# Patient Record
Sex: MALE | Race: WHITE | NOT HISPANIC OR LATINO | Employment: UNEMPLOYED | URBAN - METROPOLITAN AREA
[De-identification: names, ages, dates, MRNs, and addresses within clinical notes are randomized per-mention and may not be internally consistent; named-entity substitution may affect disease eponyms.]

---

## 2019-10-16 ENCOUNTER — HOSPITAL ENCOUNTER (EMERGENCY)
Facility: HOSPITAL | Age: 59
Discharge: HOME | End: 2019-10-16
Attending: EMERGENCY MEDICINE
Payer: MEDICAID

## 2019-10-16 VITALS
DIASTOLIC BLOOD PRESSURE: 95 MMHG | WEIGHT: 155 LBS | HEART RATE: 87 BPM | HEIGHT: 68 IN | SYSTOLIC BLOOD PRESSURE: 151 MMHG | BODY MASS INDEX: 23.49 KG/M2 | RESPIRATION RATE: 17 BRPM | TEMPERATURE: 99 F | OXYGEN SATURATION: 98 %

## 2019-10-16 DIAGNOSIS — K29.70 GASTRITIS WITHOUT BLEEDING, UNSPECIFIED CHRONICITY, UNSPECIFIED GASTRITIS TYPE: ICD-10-CM

## 2019-10-16 DIAGNOSIS — R10.9 ABDOMINAL PAIN: ICD-10-CM

## 2019-10-16 DIAGNOSIS — R10.9 ABDOMINAL PAIN, UNSPECIFIED ABDOMINAL LOCATION: Primary | ICD-10-CM

## 2019-10-16 LAB
ALBUMIN SERPL BCP-MCNC: 4.1 G/DL (ref 3.5–5.2)
ALP SERPL-CCNC: 66 U/L (ref 55–135)
ALT SERPL W/O P-5'-P-CCNC: 15 U/L (ref 10–44)
AMPHET+METHAMPHET UR QL: NEGATIVE
AMYLASE SERPL-CCNC: 36 U/L (ref 20–110)
ANION GAP SERPL CALC-SCNC: 13 MMOL/L (ref 8–16)
AST SERPL-CCNC: 21 U/L (ref 10–40)
BARBITURATES UR QL SCN>200 NG/ML: NEGATIVE
BASOPHILS # BLD AUTO: 0.05 K/UL (ref 0–0.2)
BASOPHILS NFR BLD: 0.4 % (ref 0–1.9)
BENZODIAZ UR QL SCN>200 NG/ML: NEGATIVE
BILIRUB SERPL-MCNC: 0.8 MG/DL (ref 0.1–1)
BILIRUB UR QL STRIP: NEGATIVE
BNP SERPL-MCNC: 56 PG/ML (ref 0–99)
BUN SERPL-MCNC: 11 MG/DL (ref 6–20)
BZE UR QL SCN: NEGATIVE
CALCIUM SERPL-MCNC: 9.2 MG/DL (ref 8.7–10.5)
CANNABINOIDS UR QL SCN: NEGATIVE
CHLORIDE SERPL-SCNC: 93 MMOL/L (ref 95–110)
CK MB SERPL-MCNC: 2 NG/ML (ref 0.1–6.5)
CLARITY UR: ABNORMAL
CO2 SERPL-SCNC: 32 MMOL/L (ref 23–29)
COLOR UR: YELLOW
CREAT SERPL-MCNC: 0.9 MG/DL (ref 0.5–1.4)
CREAT UR-MCNC: 94 MG/DL (ref 23–375)
DIFFERENTIAL METHOD: ABNORMAL
EOSINOPHIL # BLD AUTO: 0 K/UL (ref 0–0.5)
EOSINOPHIL NFR BLD: 0.3 % (ref 0–8)
ERYTHROCYTE [DISTWIDTH] IN BLOOD BY AUTOMATED COUNT: 13.3 % (ref 11.5–14.5)
EST. GFR  (AFRICAN AMERICAN): >60 ML/MIN/1.73 M^2
EST. GFR  (NON AFRICAN AMERICAN): >60 ML/MIN/1.73 M^2
GLUCOSE SERPL-MCNC: 144 MG/DL (ref 70–110)
GLUCOSE UR QL STRIP: NEGATIVE
HCT VFR BLD AUTO: 45.2 % (ref 40–54)
HGB BLD-MCNC: 15.1 G/DL (ref 14–18)
HGB UR QL STRIP: NEGATIVE
IMM GRANULOCYTES # BLD AUTO: 0.07 K/UL (ref 0–0.04)
IMM GRANULOCYTES NFR BLD AUTO: 0.6 % (ref 0–0.5)
KETONES UR QL STRIP: ABNORMAL
LEUKOCYTE ESTERASE UR QL STRIP: NEGATIVE
LIPASE SERPL-CCNC: 24 U/L (ref 4–60)
LYMPHOCYTES # BLD AUTO: 2 K/UL (ref 1–4.8)
LYMPHOCYTES NFR BLD: 16.3 % (ref 18–48)
MAGNESIUM SERPL-MCNC: 1.7 MG/DL (ref 1.6–2.6)
MCH RBC QN AUTO: 29.2 PG (ref 27–31)
MCHC RBC AUTO-ENTMCNC: 33.4 G/DL (ref 32–36)
MCV RBC AUTO: 87 FL (ref 82–98)
MONOCYTES # BLD AUTO: 0.9 K/UL (ref 0.3–1)
MONOCYTES NFR BLD: 6.9 % (ref 4–15)
NEUTROPHILS # BLD AUTO: 9.3 K/UL (ref 1.8–7.7)
NEUTROPHILS NFR BLD: 75.5 % (ref 38–73)
NITRITE UR QL STRIP: NEGATIVE
NRBC BLD-RTO: 0 /100 WBC
OPIATES UR QL SCN: NEGATIVE
PCP UR QL SCN>25 NG/ML: NEGATIVE
PH UR STRIP: >8 [PH] (ref 5–8)
PLATELET # BLD AUTO: 316 K/UL (ref 150–350)
PMV BLD AUTO: 8.9 FL (ref 9.2–12.9)
POTASSIUM SERPL-SCNC: 3.5 MMOL/L (ref 3.5–5.1)
PROT SERPL-MCNC: 7.6 G/DL (ref 6–8.4)
PROT UR QL STRIP: ABNORMAL
RBC # BLD AUTO: 5.17 M/UL (ref 4.6–6.2)
SODIUM SERPL-SCNC: 138 MMOL/L (ref 136–145)
SP GR UR STRIP: 1.01 (ref 1–1.03)
TOXICOLOGY INFORMATION: NORMAL
TROPONIN I SERPL DL<=0.01 NG/ML-MCNC: <0.03 NG/ML (ref 0.02–0.04)
TSH SERPL DL<=0.005 MIU/L-ACNC: 1.43 UIU/ML (ref 0.34–5.6)
URN SPEC COLLECT METH UR: ABNORMAL
UROBILINOGEN UR STRIP-ACNC: NEGATIVE EU/DL
WBC # BLD AUTO: 12.3 K/UL (ref 3.9–12.7)

## 2019-10-16 PROCEDURE — 82150 ASSAY OF AMYLASE: CPT

## 2019-10-16 PROCEDURE — 80307 DRUG TEST PRSMV CHEM ANLYZR: CPT

## 2019-10-16 PROCEDURE — 99285 EMERGENCY DEPT VISIT HI MDM: CPT | Mod: 25

## 2019-10-16 PROCEDURE — 81003 URINALYSIS AUTO W/O SCOPE: CPT | Mod: 59

## 2019-10-16 PROCEDURE — 93005 ELECTROCARDIOGRAM TRACING: CPT

## 2019-10-16 PROCEDURE — 80053 COMPREHEN METABOLIC PANEL: CPT

## 2019-10-16 PROCEDURE — 25000003 PHARM REV CODE 250: Performed by: EMERGENCY MEDICINE

## 2019-10-16 PROCEDURE — 63600175 PHARM REV CODE 636 W HCPCS: Performed by: EMERGENCY MEDICINE

## 2019-10-16 PROCEDURE — 25500020 PHARM REV CODE 255: Performed by: EMERGENCY MEDICINE

## 2019-10-16 PROCEDURE — 85025 COMPLETE CBC W/AUTO DIFF WBC: CPT

## 2019-10-16 PROCEDURE — 36415 COLL VENOUS BLD VENIPUNCTURE: CPT

## 2019-10-16 PROCEDURE — 82553 CREATINE MB FRACTION: CPT

## 2019-10-16 PROCEDURE — 83735 ASSAY OF MAGNESIUM: CPT

## 2019-10-16 PROCEDURE — 84443 ASSAY THYROID STIM HORMONE: CPT

## 2019-10-16 PROCEDURE — 96365 THER/PROPH/DIAG IV INF INIT: CPT

## 2019-10-16 PROCEDURE — 83690 ASSAY OF LIPASE: CPT

## 2019-10-16 PROCEDURE — 96375 TX/PRO/DX INJ NEW DRUG ADDON: CPT

## 2019-10-16 PROCEDURE — 84484 ASSAY OF TROPONIN QUANT: CPT

## 2019-10-16 PROCEDURE — S0028 INJECTION, FAMOTIDINE, 20 MG: HCPCS | Performed by: EMERGENCY MEDICINE

## 2019-10-16 PROCEDURE — 83880 ASSAY OF NATRIURETIC PEPTIDE: CPT

## 2019-10-16 RX ORDER — ACETAMINOPHEN 10 MG/ML
1000 INJECTION, SOLUTION INTRAVENOUS EVERY 8 HOURS
Status: DISCONTINUED | OUTPATIENT
Start: 2019-10-16 | End: 2019-10-16

## 2019-10-16 RX ORDER — METRONIDAZOLE 500 MG/1
500 TABLET ORAL 3 TIMES DAILY
Qty: 30 TABLET | Refills: 0 | Status: SHIPPED | OUTPATIENT
Start: 2019-10-16 | End: 2019-10-26

## 2019-10-16 RX ORDER — SUCRALFATE 1 G/10ML
1 SUSPENSION ORAL
Status: COMPLETED | OUTPATIENT
Start: 2019-10-16 | End: 2019-10-16

## 2019-10-16 RX ORDER — RABEPRAZOLE SODIUM 20 MG/1
20 TABLET, DELAYED RELEASE ORAL DAILY
Qty: 30 TABLET | Refills: 11 | Status: SHIPPED | OUTPATIENT
Start: 2019-10-16 | End: 2020-02-09

## 2019-10-16 RX ORDER — FAMOTIDINE 20 MG/50ML
20 INJECTION, SOLUTION INTRAVENOUS
Status: COMPLETED | OUTPATIENT
Start: 2019-10-16 | End: 2019-10-16

## 2019-10-16 RX ORDER — ONDANSETRON 4 MG/1
4 TABLET, FILM COATED ORAL EVERY 6 HOURS PRN
Qty: 12 TABLET | Refills: 0 | Status: SHIPPED | OUTPATIENT
Start: 2019-10-16 | End: 2020-02-09

## 2019-10-16 RX ORDER — CIPROFLOXACIN 500 MG/1
500 TABLET ORAL 2 TIMES DAILY
Qty: 20 TABLET | Refills: 0 | Status: SHIPPED | OUTPATIENT
Start: 2019-10-16 | End: 2019-10-26

## 2019-10-16 RX ORDER — SUCRALFATE 1 G/1
1 TABLET ORAL
Qty: 60 TABLET | Refills: 1 | Status: SHIPPED | OUTPATIENT
Start: 2019-10-16 | End: 2020-02-09

## 2019-10-16 RX ORDER — ONDANSETRON 2 MG/ML
4 INJECTION INTRAMUSCULAR; INTRAVENOUS ONCE
Status: COMPLETED | OUTPATIENT
Start: 2019-10-16 | End: 2019-10-16

## 2019-10-16 RX ORDER — ACETAMINOPHEN 10 MG/ML
1000 INJECTION, SOLUTION INTRAVENOUS EVERY 8 HOURS
Status: DISCONTINUED | OUTPATIENT
Start: 2019-10-16 | End: 2019-10-16 | Stop reason: HOSPADM

## 2019-10-16 RX ORDER — HYOSCYAMINE SULFATE 0.12 MG/1
0.12 TABLET SUBLINGUAL
Status: COMPLETED | OUTPATIENT
Start: 2019-10-16 | End: 2019-10-16

## 2019-10-16 RX ADMIN — FAMOTIDINE 20 MG: 20 INJECTION, SOLUTION INTRAVENOUS at 03:10

## 2019-10-16 RX ADMIN — IOHEXOL 100 ML: 350 INJECTION, SOLUTION INTRAVENOUS at 02:10

## 2019-10-16 RX ADMIN — SUCRALFATE 1 G: 1 SUSPENSION ORAL at 03:10

## 2019-10-16 RX ADMIN — ACETAMINOPHEN 1000 MG: 10 INJECTION, SOLUTION INTRAVENOUS at 03:10

## 2019-10-16 RX ADMIN — ONDANSETRON 4 MG: 2 INJECTION INTRAMUSCULAR; INTRAVENOUS at 02:10

## 2019-10-16 RX ADMIN — HYOSCYAMINE SULFATE 0.12 MG: 0.12 TABLET ORAL; SUBLINGUAL at 02:10

## 2019-12-21 NOTE — ED PROVIDER NOTES
Encounter Date: 10/16/2019       History     Chief Complaint   Patient presents with    Abdominal Pain     This is a 59-year-old male who presents complaining of diffuse crampy abdominal pain off and on over the last day or 2 with associated nausea and diarrhea.  Pain is worse with eating diarrhea is worse with eating and symptoms are better with not eating.  Pain has been diffuse.  Pain has been moderate to mild in intensity.  He has had several episodes of nausea vomiting and several episodes of diarrhea.  He has not visualize any gastrointestinal bleeding.  He denies weakness dizziness or lightheadedness.  He has not been around anyone with similar symptoms and has not eaten anything that he feels could have been a possible source of food poisoning.  He denies chest pain or shortness of breath he denies fever chills or weight loss.  He has not been on antibiotics recently.  He is urinating normally.  He denies fatigue fever or constitutional symptoms. He denies any other problems or complaints.        Review of patient's allergies indicates:  No Known Allergies  No past medical history on file.  No past surgical history on file.  No family history on file.  Social History     Tobacco Use    Smoking status: Not on file   Substance Use Topics    Alcohol use: Not on file    Drug use: Not on file     Review of Systems   Constitutional: Negative.  Negative for activity change, appetite change, chills, diaphoresis, fatigue, fever and unexpected weight change.   HENT: Negative.  Negative for congestion, dental problem, ear pain, nosebleeds, postnasal drip, rhinorrhea, sinus pressure, sinus pain, sore throat, tinnitus, trouble swallowing and voice change.    Eyes: Negative.  Negative for pain and visual disturbance.   Respiratory: Negative.  Negative for cough, chest tightness, shortness of breath and wheezing.    Cardiovascular: Negative.  Negative for chest pain, palpitations and leg swelling.   Gastrointestinal:  Positive for abdominal pain, diarrhea, nausea and vomiting. Negative for abdominal distention, anal bleeding, blood in stool, constipation and rectal pain.   Endocrine: Negative.    Genitourinary: Negative.  Negative for difficulty urinating, dysuria, flank pain, frequency, penile pain, scrotal swelling, testicular pain and urgency.   Musculoskeletal: Negative.  Negative for arthralgias, back pain, gait problem, joint swelling, myalgias, neck pain and neck stiffness.   Skin: Negative.  Negative for color change, pallor and rash.   Allergic/Immunologic: Negative.    Neurological: Negative.  Negative for dizziness, tremors, seizures, syncope, facial asymmetry, speech difficulty, weakness, light-headedness, numbness and headaches.   Hematological: Negative.  Negative for adenopathy. Does not bruise/bleed easily.   Psychiatric/Behavioral: Negative.  Negative for confusion.   All other systems reviewed and are negative.      Physical Exam     Initial Vitals [10/16/19 0037]   BP Pulse Resp Temp SpO2   (!) 168/105 98 16 98.7 °F (37.1 °C) 99 %      MAP       --         Physical Exam    Nursing note and vitals reviewed.  Constitutional: He appears well-developed and well-nourished. He is active.  Non-toxic appearance. He does not have a sickly appearance. He does not appear ill. No distress.   HENT:   Head: Normocephalic and atraumatic.   Nose: Nose normal.   Mouth/Throat: Uvula is midline, oropharynx is clear and moist and mucous membranes are normal.   Eyes: Conjunctivae, EOM and lids are normal. Pupils are equal, round, and reactive to light.   Neck: Normal range of motion and full passive range of motion without pain. Neck supple. No thyromegaly present. No spinous process tenderness and no muscular tenderness present. No tracheal deviation, no edema, no erythema and normal range of motion present. No neck rigidity. No JVD present.   Cardiovascular: Normal rate, regular rhythm, normal heart sounds, intact distal pulses  and normal pulses. Exam reveals no gallop and no friction rub.    No murmur heard.  Pulmonary/Chest: Effort normal and breath sounds normal. No stridor. No respiratory distress. He has no wheezes. He has no rhonchi. He has no rales.   Abdominal: Soft. Normal appearance. He exhibits no distension and no mass. Bowel sounds are increased. There is generalized tenderness. There is no rigidity, no rebound, no guarding and no CVA tenderness. No hernia.   Musculoskeletal: He exhibits no tenderness.        Cervical back: Normal. He exhibits normal range of motion, no tenderness, no bony tenderness and no swelling.        Thoracic back: He exhibits normal range of motion, no tenderness, no bony tenderness and no swelling.        Lumbar back: Normal. He exhibits normal range of motion, no tenderness, no bony tenderness, no swelling and no edema.   Pulses are 2+ throughout, cap refill is less than 2 sec throughout, no edema noted, extremities are nontender throughout with full range of motion   Lymphadenopathy:     He has no cervical adenopathy.   Neurological: He is alert and oriented to person, place, and time. He has normal strength. No cranial nerve deficit or sensory deficit. Coordination normal.   Skin: Skin is warm and dry. Capillary refill takes less than 2 seconds. No ecchymosis, no petechiae and no rash noted. No cyanosis or erythema. No pallor.   Psychiatric: He has a normal mood and affect. His speech is normal and behavior is normal. Judgment and thought content normal. Cognition and memory are normal.         ED Course   Procedures  Labs Reviewed   CBC W/ AUTO DIFFERENTIAL - Abnormal; Notable for the following components:       Result Value    MPV 8.9 (*)     Immature Granulocytes 0.6 (*)     Gran # (ANC) 9.3 (*)     Immature Grans (Abs) 0.07 (*)     Gran% 75.5 (*)     Lymph% 16.3 (*)     All other components within normal limits   COMPREHENSIVE METABOLIC PANEL - Abnormal; Notable for the following components:     Chloride 93 (*)     CO2 32 (*)     Glucose 144 (*)     All other components within normal limits   URINALYSIS, REFLEX TO URINE CULTURE - Abnormal; Notable for the following components:    Appearance, UA Hazy (*)     pH, UA >8.0 (*)     Protein, UA Trace (*)     Ketones, UA 1+ (*)     All other components within normal limits    Narrative:     Preferred Collection Type->Urine, Clean Catch  Specimen Source->Urine   LIPASE   DRUG SCREEN PANEL, URINE EMERGENCY   AMYLASE   B-TYPE NATRIURETIC PEPTIDE   CK   CK-MB   MAGNESIUM   TROPONIN I   TSH   URINALYSIS   AMYLASE   CK-MB   B-TYPE NATRIURETIC PEPTIDE   MAGNESIUM   TSH   TROPONIN I   DRUG SCREEN PANEL, URINE EMERGENCY        ECG Results          EKG 12-lead (In process)  Result time 10/16/19 04:33:17    In process by Interface, Lab In [facility] (10/16/19 04:33:17)                 Narrative:    Test Reason : R10.9,    Vent. Rate : 092 BPM     Atrial Rate : 092 BPM     P-R Int : 156 ms          QRS Dur : 082 ms      QT Int : 464 ms       P-R-T Axes : 069 000 104 degrees     QTc Int : 573 ms    Sinus rhythm with occasional Premature ventricular complexes  Biatrial enlargement  Septal infarct ,age undetermined  ST and T wave abnormality, consider inferior ischemia  Abnormal ECG  No previous ECGs available    Referred By: [email or web address]   SELF           Confirmed By:                             Imaging Results          CT Abdomen Pelvis With Contrast (Final result)  Result time 10/16/19 02:21:42    Final result by Jose Denson MD (10/16/19 02:21:42)                 Narrative:        Exam: CT OF THE ABDOMEN/PELVIS WITH IV CONTRAST    Clinical data: Abdominal pain.    Technique: Direct contiguous axial CT images were acquired through the abdomen  and pelvis with intravenous contrast using soft tissue and bone algorithms. Oral  contrast was not administered. Reformatted/MPR images were performed. Contrast  used: Omnipaque 350. Amount: 100 mL. Radiation dose:  CTDIvol = 6.90 mGy, DLP  =  341.00 mGy x cm.    Limitations: Lack of oral contrast limits evaluation of the bowel loops.    Prior Studies: No prior studies submitted.    Findings: Lung bases:  Clear    Liver:   Unremarkable size and contour. Normal density. No evidence of mass. No  evidence of dilated ducts.    Gallbladder:  Unremarkable    Spleen:  Grossly unremarkable.    Pancreas/adrenal glands:   Grossly unremarkable size, contour and density.    Kidneys:   In anatomic position. Grossly unremarkable renal size, contour and  density. No renal or ureteral calculi. No evidence of a renal mass or cyst.  Perinephric space is unremarkable.    Retroperitoneum: No enlarged retroperitoneal lymphadenopathy. The aorta and IVC  appear unremarkable.    Peritoneal cavity:  No evidence of free air or ascites.    Gastrointestinal tract: No obstruction.  Mucosal thickening and enhancement of  the stomach, the stomach is moderately distended with fluid.  Mucosal thickening  and enhancement of the first and second part of the duodenum.  Similar milder  changes of the proximal jejunum.    Appendix:  Unremarkable    Pelvis:  Solid and hollow viscera grossly unremarkable.    A small umbilical hernia is identified containing intra-abdominal fat with  defect measuring 1.5 cm.    Osseous structures:  No acute or destructive bony process identified.    IMPRESSION:  1.  Moderately severe gastroenteritis, etiology may be infectious, inflammatory  or related to peptic ulcer disease.  2.  Small umbilical hernia containing intra-abdominal fat.    Recommendation: Follow up as clinically indicated.    All CT scans at this facility utilize dose modulation, iterative reconstruction,  and/or weight based dosing when appropriate to reduce radiation dose to as low  as reasonably achievable.      Electronically Signed by MARIAM LOCO MD at 10/16/2019 3:43:29 AM                               Medical Decision Making:   Clinical Tests:   Lab Tests: Reviewed  Radiological  Study: Reviewed  Medical Tests: Reviewed  ED Management:  CT suggestive of gastroenteritis which coincides with symptoms.  Patient is currently tolerating oral fluids.  He feels better.  He is hemodynamically stable. Importance of follow-up has been discussed.  Return precautions have been discussed in detail.  Abdominal reassessed is soft and nontender in all quadrants.  Have explained the need for blood pressure re-evaluation within 24 hr as blood pressure has been slightly elevated and blood pressure precautions have been given.                                 Clinical Impression:       ICD-10-CM ICD-9-CM   1. Abdominal pain, unspecified abdominal location R10.9 789.00   2. Abdominal pain R10.9 789.00   3. Gastritis without bleeding, unspecified chronicity, unspecified gastritis type K29.70 535.50                             Raquel Ceballos MD  12/20/19 [id number]

## 2020-02-09 ENCOUNTER — HOSPITAL ENCOUNTER (INPATIENT)
Facility: HOSPITAL | Age: 60
LOS: 4 days | Discharge: HOME | DRG: 195 | End: 2020-02-13
Attending: EMERGENCY MEDICINE | Admitting: INTERNAL MEDICINE
Payer: MEDICAID

## 2020-02-09 DIAGNOSIS — J18.9 PNEUMONIA: ICD-10-CM

## 2020-02-09 DIAGNOSIS — R07.9 CHEST PAIN: ICD-10-CM

## 2020-02-09 DIAGNOSIS — J15.9 PNEUMONIA DUE TO GRAM-POSITIVE BACTERIA: Primary | ICD-10-CM

## 2020-02-09 PROBLEM — D50.9 MICROCYTIC HYPOCHROMIC ANEMIA: Status: ACTIVE | Noted: 2020-02-09

## 2020-02-09 PROBLEM — F17.200 TOBACCO DEPENDENCE: Status: ACTIVE | Noted: 2020-02-09

## 2020-02-09 PROBLEM — E87.6 HYPOKALEMIA: Status: ACTIVE | Noted: 2020-02-09

## 2020-02-09 LAB
ALBUMIN SERPL BCP-MCNC: 3.2 G/DL (ref 3.5–5.2)
ALP SERPL-CCNC: 60 U/L (ref 55–135)
ALT SERPL W/O P-5'-P-CCNC: 36 U/L (ref 10–44)
ANION GAP SERPL CALC-SCNC: 10 MMOL/L (ref 8–16)
AST SERPL-CCNC: 37 U/L (ref 10–40)
BASOPHILS # BLD AUTO: 0.06 K/UL (ref 0–0.2)
BASOPHILS NFR BLD: 0.4 % (ref 0–1.9)
BILIRUB SERPL-MCNC: 0.7 MG/DL (ref 0.1–1)
BNP SERPL-MCNC: 41 PG/ML (ref 0–99)
BUN SERPL-MCNC: 17 MG/DL (ref 6–20)
CALCIUM SERPL-MCNC: 8.5 MG/DL (ref 8.7–10.5)
CHLORIDE SERPL-SCNC: 102 MMOL/L (ref 95–110)
CO2 SERPL-SCNC: 26 MMOL/L (ref 23–29)
CREAT SERPL-MCNC: 0.8 MG/DL (ref 0.5–1.4)
DIFFERENTIAL METHOD: ABNORMAL
EOSINOPHIL # BLD AUTO: 0.1 K/UL (ref 0–0.5)
EOSINOPHIL NFR BLD: 1 % (ref 0–8)
ERYTHROCYTE [DISTWIDTH] IN BLOOD BY AUTOMATED COUNT: 21.3 % (ref 11.5–14.5)
EST. GFR  (AFRICAN AMERICAN): >60 ML/MIN/1.73 M^2
EST. GFR  (NON AFRICAN AMERICAN): >60 ML/MIN/1.73 M^2
GLUCOSE SERPL-MCNC: 112 MG/DL (ref 70–110)
HCT VFR BLD AUTO: 29 % (ref 40–54)
HGB BLD-MCNC: 8.8 G/DL (ref 14–18)
IMM GRANULOCYTES # BLD AUTO: 0.17 K/UL (ref 0–0.04)
IMM GRANULOCYTES NFR BLD AUTO: 1.2 % (ref 0–0.5)
INFLUENZA A, MOLECULAR: NEGATIVE
INFLUENZA B, MOLECULAR: NEGATIVE
LACTATE SERPL-SCNC: 1.5 MMOL/L (ref 0.5–1.9)
LYMPHOCYTES # BLD AUTO: 1.4 K/UL (ref 1–4.8)
LYMPHOCYTES NFR BLD: 9.4 % (ref 18–48)
MCH RBC QN AUTO: 21.1 PG (ref 27–31)
MCHC RBC AUTO-ENTMCNC: 30.3 G/DL (ref 32–36)
MCV RBC AUTO: 69 FL (ref 82–98)
MONOCYTES # BLD AUTO: 1.2 K/UL (ref 0.3–1)
MONOCYTES NFR BLD: 7.9 % (ref 4–15)
MRSA SCREEN BY PCR: NEGATIVE
NEUTROPHILS # BLD AUTO: 11.7 K/UL (ref 1.8–7.7)
NEUTROPHILS NFR BLD: 80.1 % (ref 38–73)
NRBC BLD-RTO: 0 /100 WBC
PLATELET # BLD AUTO: 501 K/UL (ref 150–350)
PMV BLD AUTO: 8.3 FL (ref 9.2–12.9)
POTASSIUM SERPL-SCNC: 3.4 MMOL/L (ref 3.5–5.1)
PROT SERPL-MCNC: 7.3 G/DL (ref 6–8.4)
RBC # BLD AUTO: 4.18 M/UL (ref 4.6–6.2)
SODIUM SERPL-SCNC: 138 MMOL/L (ref 136–145)
SPECIMEN SOURCE: NORMAL
TROPONIN I SERPL DL<=0.01 NG/ML-MCNC: <0.03 NG/ML
TROPONIN I SERPL DL<=0.01 NG/ML-MCNC: <0.03 NG/ML
WBC # BLD AUTO: 14.6 K/UL (ref 3.9–12.7)

## 2020-02-09 PROCEDURE — 12000002 HC ACUTE/MED SURGE SEMI-PRIVATE ROOM

## 2020-02-09 PROCEDURE — 84484 ASSAY OF TROPONIN QUANT: CPT

## 2020-02-09 PROCEDURE — 25000003 PHARM REV CODE 250: Performed by: EMERGENCY MEDICINE

## 2020-02-09 PROCEDURE — 25000003 PHARM REV CODE 250: Performed by: NURSE PRACTITIONER

## 2020-02-09 PROCEDURE — 99285 EMERGENCY DEPT VISIT HI MDM: CPT | Mod: 25

## 2020-02-09 PROCEDURE — 87641 MR-STAPH DNA AMP PROBE: CPT

## 2020-02-09 PROCEDURE — 63600175 PHARM REV CODE 636 W HCPCS: Performed by: NURSE PRACTITIONER

## 2020-02-09 PROCEDURE — 93005 ELECTROCARDIOGRAM TRACING: CPT

## 2020-02-09 PROCEDURE — 25000242 PHARM REV CODE 250 ALT 637 W/ HCPCS: Performed by: NURSE PRACTITIONER

## 2020-02-09 PROCEDURE — 99900035 HC TECH TIME PER 15 MIN (STAT)

## 2020-02-09 PROCEDURE — 80053 COMPREHEN METABOLIC PANEL: CPT

## 2020-02-09 PROCEDURE — 85025 COMPLETE CBC W/AUTO DIFF WBC: CPT

## 2020-02-09 PROCEDURE — 87502 INFLUENZA DNA AMP PROBE: CPT

## 2020-02-09 PROCEDURE — 84484 ASSAY OF TROPONIN QUANT: CPT | Mod: 91

## 2020-02-09 PROCEDURE — 36415 COLL VENOUS BLD VENIPUNCTURE: CPT

## 2020-02-09 PROCEDURE — 94761 N-INVAS EAR/PLS OXIMETRY MLT: CPT

## 2020-02-09 PROCEDURE — 94640 AIRWAY INHALATION TREATMENT: CPT

## 2020-02-09 PROCEDURE — 83605 ASSAY OF LACTIC ACID: CPT

## 2020-02-09 PROCEDURE — 83880 ASSAY OF NATRIURETIC PEPTIDE: CPT

## 2020-02-09 PROCEDURE — 25000242 PHARM REV CODE 250 ALT 637 W/ HCPCS: Performed by: EMERGENCY MEDICINE

## 2020-02-09 PROCEDURE — 87040 BLOOD CULTURE FOR BACTERIA: CPT

## 2020-02-09 RX ORDER — SIMETHICONE 80 MG
2 TABLET,CHEWABLE ORAL
Status: COMPLETED | OUTPATIENT
Start: 2020-02-09 | End: 2020-02-09

## 2020-02-09 RX ORDER — LEVALBUTEROL INHALATION SOLUTION 0.63 MG/3ML
0.63 SOLUTION RESPIRATORY (INHALATION)
Status: DISCONTINUED | OUTPATIENT
Start: 2020-02-10 | End: 2020-02-12

## 2020-02-09 RX ORDER — HYDROCODONE POLISTIREX AND CHLORPHENIRAMINE POLISTIREX 10; 8 MG/5ML; MG/5ML
5 SUSPENSION, EXTENDED RELEASE ORAL EVERY 12 HOURS PRN
Status: DISCONTINUED | OUTPATIENT
Start: 2020-02-09 | End: 2020-02-13 | Stop reason: HOSPADM

## 2020-02-09 RX ORDER — KETOROLAC TROMETHAMINE 30 MG/ML
15 INJECTION, SOLUTION INTRAMUSCULAR; INTRAVENOUS EVERY 6 HOURS PRN
Status: DISCONTINUED | OUTPATIENT
Start: 2020-02-09 | End: 2020-02-10

## 2020-02-09 RX ORDER — ACETAMINOPHEN 500 MG
1000 TABLET ORAL
Status: COMPLETED | OUTPATIENT
Start: 2020-02-09 | End: 2020-02-09

## 2020-02-09 RX ORDER — METHYLPREDNISOLONE SOD SUCC 125 MG
80 VIAL (EA) INJECTION EVERY 8 HOURS
Status: COMPLETED | OUTPATIENT
Start: 2020-02-09 | End: 2020-02-10

## 2020-02-09 RX ORDER — MONTELUKAST SODIUM 10 MG/1
10 TABLET ORAL NIGHTLY
Status: DISCONTINUED | OUTPATIENT
Start: 2020-02-09 | End: 2020-02-13 | Stop reason: HOSPADM

## 2020-02-09 RX ORDER — ONDANSETRON 2 MG/ML
4 INJECTION INTRAMUSCULAR; INTRAVENOUS EVERY 8 HOURS PRN
Status: DISCONTINUED | OUTPATIENT
Start: 2020-02-09 | End: 2020-02-13 | Stop reason: HOSPADM

## 2020-02-09 RX ORDER — IBUPROFEN 200 MG
1 TABLET ORAL DAILY
Status: DISCONTINUED | OUTPATIENT
Start: 2020-02-10 | End: 2020-02-13 | Stop reason: HOSPADM

## 2020-02-09 RX ORDER — POTASSIUM CHLORIDE 7.45 MG/ML
40 INJECTION INTRAVENOUS
Status: DISCONTINUED | OUTPATIENT
Start: 2020-02-09 | End: 2020-02-13 | Stop reason: HOSPADM

## 2020-02-09 RX ORDER — LEVALBUTEROL INHALATION SOLUTION 0.63 MG/3ML
0.63 SOLUTION RESPIRATORY (INHALATION) EVERY 6 HOURS
Status: DISCONTINUED | OUTPATIENT
Start: 2020-02-09 | End: 2020-02-09

## 2020-02-09 RX ORDER — MAGNESIUM SULFATE HEPTAHYDRATE 40 MG/ML
2 INJECTION, SOLUTION INTRAVENOUS
Status: DISCONTINUED | OUTPATIENT
Start: 2020-02-09 | End: 2020-02-13 | Stop reason: HOSPADM

## 2020-02-09 RX ORDER — LEVOFLOXACIN 5 MG/ML
750 INJECTION, SOLUTION INTRAVENOUS
Status: DISCONTINUED | OUTPATIENT
Start: 2020-02-09 | End: 2020-02-13 | Stop reason: HOSPADM

## 2020-02-09 RX ORDER — LANOLIN ALCOHOL/MO/W.PET/CERES
800 CREAM (GRAM) TOPICAL
Status: DISCONTINUED | OUTPATIENT
Start: 2020-02-09 | End: 2020-02-13 | Stop reason: HOSPADM

## 2020-02-09 RX ORDER — POTASSIUM CHLORIDE 7.45 MG/ML
20 INJECTION INTRAVENOUS
Status: DISCONTINUED | OUTPATIENT
Start: 2020-02-09 | End: 2020-02-13 | Stop reason: HOSPADM

## 2020-02-09 RX ORDER — MAGNESIUM SULFATE 1 G/100ML
1 INJECTION INTRAVENOUS
Status: DISCONTINUED | OUTPATIENT
Start: 2020-02-09 | End: 2020-02-13 | Stop reason: HOSPADM

## 2020-02-09 RX ORDER — KETOROLAC TROMETHAMINE 30 MG/ML
30 INJECTION, SOLUTION INTRAMUSCULAR; INTRAVENOUS EVERY 6 HOURS PRN
Status: DISCONTINUED | OUTPATIENT
Start: 2020-02-09 | End: 2020-02-10

## 2020-02-09 RX ORDER — CALCIUM CHLORIDE IN 0.9 % NACL 1 G/100 ML
1 INTRAVENOUS SOLUTION, PIGGYBACK (ML) INTRAVENOUS
Status: DISCONTINUED | OUTPATIENT
Start: 2020-02-09 | End: 2020-02-13 | Stop reason: HOSPADM

## 2020-02-09 RX ORDER — POTASSIUM CHLORIDE 20 MEQ/1
40 TABLET, EXTENDED RELEASE ORAL
Status: DISCONTINUED | OUTPATIENT
Start: 2020-02-09 | End: 2020-02-13 | Stop reason: HOSPADM

## 2020-02-09 RX ORDER — POTASSIUM CHLORIDE 20 MEQ/1
20 TABLET, EXTENDED RELEASE ORAL
Status: DISCONTINUED | OUTPATIENT
Start: 2020-02-09 | End: 2020-02-13 | Stop reason: HOSPADM

## 2020-02-09 RX ORDER — ACETAMINOPHEN 325 MG/1
650 TABLET ORAL EVERY 4 HOURS PRN
Status: DISCONTINUED | OUTPATIENT
Start: 2020-02-09 | End: 2020-02-13 | Stop reason: HOSPADM

## 2020-02-09 RX ORDER — SODIUM CHLORIDE 0.9 % (FLUSH) 0.9 %
10 SYRINGE (ML) INJECTION
Status: DISCONTINUED | OUTPATIENT
Start: 2020-02-09 | End: 2020-02-13 | Stop reason: HOSPADM

## 2020-02-09 RX ORDER — METHYLPREDNISOLONE SOD SUCC 125 MG
125 VIAL (EA) INJECTION
Status: COMPLETED | OUTPATIENT
Start: 2020-02-09 | End: 2020-02-09

## 2020-02-09 RX ORDER — MAGNESIUM SULFATE HEPTAHYDRATE 40 MG/ML
4 INJECTION, SOLUTION INTRAVENOUS
Status: DISCONTINUED | OUTPATIENT
Start: 2020-02-09 | End: 2020-02-13 | Stop reason: HOSPADM

## 2020-02-09 RX ORDER — LEVOFLOXACIN 5 MG/ML
750 INJECTION, SOLUTION INTRAVENOUS
Status: DISCONTINUED | OUTPATIENT
Start: 2020-02-09 | End: 2020-02-09

## 2020-02-09 RX ORDER — IPRATROPIUM BROMIDE AND ALBUTEROL SULFATE 2.5; .5 MG/3ML; MG/3ML
3 SOLUTION RESPIRATORY (INHALATION)
Status: COMPLETED | OUTPATIENT
Start: 2020-02-09 | End: 2020-02-09

## 2020-02-09 RX ADMIN — METHYLPREDNISOLONE SODIUM SUCCINATE 80 MG: 125 INJECTION, POWDER, FOR SOLUTION INTRAMUSCULAR; INTRAVENOUS at 11:02

## 2020-02-09 RX ADMIN — KETOROLAC TROMETHAMINE 30 MG: 30 INJECTION, SOLUTION INTRAMUSCULAR at 05:02

## 2020-02-09 RX ADMIN — PIPERACILLIN AND TAZOBACTAM 4.5 G: 4; .5 INJECTION, POWDER, LYOPHILIZED, FOR SOLUTION INTRAVENOUS; PARENTERAL at 04:02

## 2020-02-09 RX ADMIN — SIMETHICONE 160 MG: 80 TABLET, CHEWABLE ORAL at 08:02

## 2020-02-09 RX ADMIN — MONTELUKAST 10 MG: 10 TABLET, FILM COATED ORAL at 08:02

## 2020-02-09 RX ADMIN — IPRATROPIUM BROMIDE AND ALBUTEROL SULFATE 3 ML: .5; 3 SOLUTION RESPIRATORY (INHALATION) at 04:02

## 2020-02-09 RX ADMIN — SIMETHICONE 160 MG: 80 TABLET, CHEWABLE ORAL at 11:02

## 2020-02-09 RX ADMIN — SODIUM CHLORIDE 2040 ML: 0.9 INJECTION, SOLUTION INTRAVENOUS at 04:02

## 2020-02-09 RX ADMIN — LEVOFLOXACIN 750 MG: 750 INJECTION, SOLUTION INTRAVENOUS at 08:02

## 2020-02-09 RX ADMIN — ACETAMINOPHEN 1000 MG: 500 TABLET, FILM COATED ORAL at 03:02

## 2020-02-09 RX ADMIN — METHYLPREDNISOLONE SODIUM SUCCINATE 125 MG: 125 INJECTION, POWDER, FOR SOLUTION INTRAMUSCULAR; INTRAVENOUS at 04:02

## 2020-02-09 RX ADMIN — LEVALBUTEROL HYDROCHLORIDE 0.63 MG: 0.63 SOLUTION RESPIRATORY (INHALATION) at 10:02

## 2020-02-09 NOTE — H&P
[facility] Medicine  History & Physical    Patient Name: Marcos Lynch  MRN: [id number]  Admission Date: 2/9/2020  Attending Physician: Erasmo Salas MD   Primary Care Provider: Primary Doctor No         Patient information was obtained from patient and ER records.     Subjective:     Principal Problem:<principal problem not specified>    Chief Complaint:   Chief Complaint   Patient presents with    Flank Pain     RT , ONSET THIS AM, PAIN WORSE WITH BREATHING    PAIN WITH RESPIRATIONS        HPI: Marcos Lynch is a 60 y.o. male with a history as  has a past medical history of Hard of hearing. who presented to the ED with a Flank Pain (RT , ONSET THIS AM, PAIN WORSE WITH BREATHING) and PAIN WITH RESPIRATIONS.  Patient report productive cough, subjective fever, chills and pleuritic chest pain (described as sharp and 9/10 PRS) with inspiration and SOB. Denies dizziness, HA, chest pain, palpitations, NVD, recent trauma or any other associated symptoms.  [facility] discharged on medications (regimen completed, unsure what medication her was discharged on).  Further reports symptoms improved, but then worsened.  Lab and imaging obtained and reviewed. CBC significant for elevated WBC and decreased H/H.  CXR showed lateral right lung base airspace disease concerning for pneumonia. Admitted to med-surg for failed outpatient therapy.                    Past Medical History:   Diagnosis Date    Hard of hearing        No past surgical history on file.    Review of patient's allergies indicates:  No Known Allergies    No current facility-administered medications on file prior to encounter.      Current Outpatient Medications on File Prior to Encounter   Medication Sig    [DISCONTINUED] ondansetron (ZOFRAN) 4 MG tablet Take 1 tablet (4 mg total) by mouth every 6 (six) hours as needed for Nausea (or vomiting).    [DISCONTINUED] RABEprazole  (ACIPHEX) 20 mg tablet Take 1 tablet (20 mg total) by mouth once daily.    [DISCONTINUED] sucralfate (CARAFATE) 1 gram tablet Take 1 tablet (1 g total) by mouth 4 (four) times daily before meals and nightly.     Family History     None        Tobacco Use    Smoking status: Not on file   Substance and Sexual Activity    Alcohol use: Not on file    Drug use: Not on file    Sexual activity: Not on file     Review of Systems   Constitutional: Positive for chills, diaphoresis and fever.   HENT: Positive for congestion. Negative for postnasal drip, sinus pressure and sore throat.    Eyes: Negative for visual disturbance.   Respiratory: Positive for cough (productive) and wheezing. Negative for chest tightness and shortness of breath.    Cardiovascular: Positive for chest pain (with inspiration). Negative for palpitations and leg swelling.   Gastrointestinal: Negative for abdominal distention, abdominal pain, blood in stool, constipation, diarrhea, nausea and vomiting.   Endocrine: Negative.    Genitourinary: Negative for dysuria.   Musculoskeletal: Negative.    Skin: Negative.    Allergic/Immunologic: Negative.    Neurological: Negative for dizziness, weakness, numbness and headaches.   Hematological: Negative.    Psychiatric/Behavioral: Negative.      Objective:     Vital Signs (Most Recent):  Temp: 100.2 °F (37.9 °C) (02/09/20 1605)  Pulse: (!) 112 (02/09/20 1704)  Resp: 16 (02/09/20 1704)  BP: 127/69 (02/09/20 1704)  SpO2: 97 % (02/09/20 1704) Vital Signs (24h Range):  Temp:  [100.2 °F (37.9 °C)-102.3 °F (39.1 °C)] 100.2 °F (37.9 °C)  Pulse:  [112-130] 112  Resp:  [16-30] 16  SpO2:  [95 %-97 %] 97 %  BP: (127-143)/(69-88) 127/69     Weight: 68 kg (150 lb)  Body mass index is 24.21 kg/m².    Physical Exam   Constitutional: He is oriented to person, place, and time. He appears well-developed and well-nourished. He is cooperative.  Non-toxic appearance. He appears ill. No distress.   HENT:   Head: Normocephalic and  atraumatic.   Eyes: Pupils are equal, round, and reactive to light. Conjunctivae and lids are normal.   Neck: Trachea normal, normal range of motion and full passive range of motion without pain. Neck supple. Normal carotid pulses and no JVD present. No tracheal deviation present. No thyroid mass and no thyromegaly present.   Cardiovascular: Regular rhythm, S1 normal, S2 normal, normal heart sounds and normal pulses. Tachycardia present.   Pulmonary/Chest: Effort normal. No stridor. He has wheezes.   Abdominal: Soft. Normal appearance and bowel sounds are normal. There is no tenderness.   Musculoskeletal: Normal range of motion.   Neurological: He is alert and oriented to person, place, and time. He has normal strength. No cranial nerve deficit or sensory deficit.   Skin: Skin is warm, dry and intact. He is not diaphoretic. No cyanosis. Nails show no clubbing.   Psychiatric: He has a normal mood and affect. His speech is normal and behavior is normal. Judgment and thought content normal. Cognition and memory are normal.         CRANIAL NERVES     CN III, IV, VI   Pupils are equal, round, and reactive to light.       Significant Labs:   ABGs: No results for input(s): PH, PCO2, HCO3, POCSATURATED, BE, TOTALHB, COHB, METHB, O2HB, POCFIO2 in the last 48 hours.  Bilirubin:   Recent Labs   Lab 02/09/20  1500   BILITOT 0.7     Blood Culture: No results for input(s): LABBLOO in the last 48 hours.  BMP:   Recent Labs   Lab 02/09/20  1500   *      K 3.4*      CO2 26   BUN 17   CREATININE 0.8   CALCIUM 8.5*     CBC:   Recent Labs   Lab 02/09/20  1500   WBC 14.60*   HGB 8.8*   HCT 29.0*   *     CMP:   Recent Labs   Lab 02/09/20  1500      K 3.4*      CO2 26   *   BUN 17   CREATININE 0.8   CALCIUM 8.5*   PROT 7.3   ALBUMIN 3.2*   BILITOT 0.7   ALKPHOS 60   AST 37   ALT 36   ANIONGAP 10   EGFRNONAA >60.0     Cardiac Markers:   Recent Labs   Lab 02/09/20  1500   BNP 41     Coagulation:  No results for input(s): PT, INR, APTT in the last 48 hours.  Lactic Acid: No results for input(s): LACTATE in the last 48 hours.  Lipase: No results for input(s): LIPASE in the last 48 hours.  Magnesium: No results for input(s): MG in the last 48 hours.  Troponin:   Recent Labs   Lab 02/09/20  1500   TROPONINI <0.030     TSH:   Recent Labs   Lab 10/16/19  0053   TSH 1.430     Urine Studies: No results for input(s): COLORU, APPEARANCEUA, PHUR, SPECGRAV, PROTEINUA, GLUCUA, KETONESU, BILIRUBINUA, OCCULTUA, NITRITE, UROBILINOGEN, LEUKOCYTESUR, RBCUA, WBCUA, BACTERIA, SQUAMEPITHEL, HYALINECASTS in the last 48 hours.    Invalid input(s): WRIGHTSUR    Significant Imaging: I have reviewed all pertinent imaging results/findings within the past 24 hours.     X-ray Chest Pa And Lateral    Result Date: 2/9/2020  EXAMINATION: XR CHEST PA AND LATERAL CLINICAL HISTORY: Chest Pain; COMPARISON: 05/11/2016 FINDINGS: Cardiac silhouette size is within normal limits.  Soft tissue hilar prominence is evident bilaterally.  Airspace disease involving the lateral aspect of the right lung base.  There is less pronounced airspace opacity involving the lateral aspect of the left lung base.  There is gas beneath the right hemidiaphragm which could be within the colon, although review of recent CT scan from October 2019 does not demonstrate colonic interposition.  No pneumothorax.  No large pleural effusion.     Lateral right lung base airspace disease concerning for pneumonia.  Follow-up chest radiography is recommended. Gas beneath the right hemidiaphragm as discussed above.  Decubitus views of the abdomen are recommended to rule out free air. Electronically signed by: Jose Guadalupe Lu MD Date:    02/09/2020 Time:    16:49    X-ray Abdomen Flat And Erect    Result Date: 2/9/2020  EXAMINATION: XR ABDOMEN FLAT AND ERECT CLINICAL HISTORY: Pneumonia, unspecified organism FINDINGS: Supine and left lateral decubitus abdomen show no pneumoperitoneum.   Bowel gas pattern is nonobstructive.  No intra-abdominal mass effect organomegaly. Scattered vascular calcification incidentally noted.  No acute osseous abnormality.     No acute intra-abdominal abnormality. Electronically signed by: Sincere Ramos MD Date:    02/09/2020 Time:    17:35      Assessment/Plan:     * Community acquired pneumonia of right lung  CXR concerning for right revealed pneumonia, right sided .   O2 PRN - keep sats >93%, Pulse oximetry q 4 with vital signs  Xopenex q6 hours, tachycardiac    Methylprednisolone 80 mg q8 X 3 doses - then progressive wean accordingly  Continue IV ABX  Blood and sputum cultures  CBC in AM  CXR in AM  Tramadol PRN pain          Microcytic hypochromic anemia  HGB/HCT stable, however, with significant drop since 10/2019. No evidence of overt bleeding.   Stool for occult bleeding, repeat CBC for confirmation  Monitor  CBC in AM          Hypokalemia  Monitor and Replete PRN    Tobacco dependence  Patient was counseled on smoking cessation and is currently ready to stop smoking.  Will have a nicotine transdermal patch applied while inpatient.  Will provide additional smoking cessation counseling prior to discharge.          VTE Risk Mitigation (From admission, onward)         Ordered     Place IRMA hose  Until discontinued      02/09/20 1636     Place sequential compression device  Until discontinued      02/09/20 1636     IP VTE LOW RISK PATIENT  Once      02/09/20 1636                   KYLE Flanagan  Department of Hospital Medicine   [facility]

## 2020-02-09 NOTE — ED NOTES
Pt was sweaty earlier as fev3er was breaking, now warm and dry. Taking shallow breaths secondary painful inspirations, encouraged to slow breathing will medicate with Toradol as ordered

## 2020-02-09 NOTE — ASSESSMENT & PLAN NOTE
CXR concerning for right revealed pneumonia, right sided .   O2 PRN - keep sats >93%, Pulse oximetry q 4 with vital signs  Xopenex q6 hours, tachycardiac    Methylprednisolone 80 mg q8 X 3 doses - then progressive wean accordingly  Continue IV ABX  Blood and sputum cultures  CBC in AM  CXR in AM  Tramadol PRN pain

## 2020-02-09 NOTE — ED PROVIDER NOTES
Encounter Date: 2/9/2020       History     Chief Complaint   Patient presents with    Flank Pain     RT , ONSET THIS AM, PAIN WORSE WITH BREATHING    PAIN WITH RESPIRATIONS     60-year-old male presents to the emergency department with complaint of pain to the right posterior thoracic area that has been ongoing for a few days.  Patient reports he was recently diagnosed with bilateral pneumonia [facility] and he finished azithromycin.        Review of patient's allergies indicates:  No Known Allergies  Past Medical History:   Diagnosis Date    Hard of hearing      No past surgical history on file.  No family history on file.  Social History     Tobacco Use    Smoking status: Not on file   Substance Use Topics    Alcohol use: Not on file    Drug use: Not on file     Review of Systems   Constitutional: Positive for chills and fever.   HENT: Positive for congestion.    Respiratory: Positive for cough.    Cardiovascular: Negative.    Gastrointestinal: Negative.    Genitourinary: Negative.    Musculoskeletal: Negative.    Allergic/Immunologic: Negative.    Neurological: Negative.    Hematological: Negative.    Psychiatric/Behavioral: Negative.        Physical Exam     Initial Vitals [02/09/20 1446]   BP Pulse Resp Temp SpO2   (!) 143/88 (!) 130 (!) 22 (!) 102.3 °F (39.1 °C) 95 %      MAP       --         Physical Exam    Constitutional: He appears well-developed and well-nourished.   Febrile   HENT:   Head: Normocephalic and atraumatic.   Right Ear: External ear normal.   Left Ear: External ear normal.   Nose: Nose normal.   Eyes: EOM are normal. Pupils are equal, round, and reactive to light.   Neck: Normal range of motion. Neck supple.   Cardiovascular: Normal heart sounds.   Tachycardic,    Pulmonary/Chest: He has wheezes. He has rhonchi.   Abdominal: Soft. Bowel sounds are normal.   Musculoskeletal: Normal range of motion. He exhibits no tenderness.   Neurological: He is alert and  oriented to person, place, and time. He has normal strength. GCS score is 15. GCS eye subscore is 4. GCS verbal subscore is 5. GCS motor subscore is 6.   Skin: Skin is warm. Capillary refill takes less than 2 seconds. No rash noted.   Psychiatric: He has a normal mood and affect.         ED Course   Procedures  Labs Reviewed   CBC W/ AUTO DIFFERENTIAL - Abnormal; Notable for the following components:       Result Value    WBC 14.60 (*)     RBC 4.18 (*)     Hemoglobin 8.8 (*)     Hematocrit 29.0 (*)     Mean Corpuscular Volume 69 (*)     Mean Corpuscular Hemoglobin 21.1 (*)     Mean Corpuscular Hemoglobin Conc 30.3 (*)     RDW 21.3 (*)     Platelets 501 (*)     MPV 8.3 (*)     Immature Granulocytes 1.2 (*)     Gran # (ANC) 11.7 (*)     Immature Grans (Abs) 0.17 (*)     Mono # 1.2 (*)     Gran% 80.1 (*)     Lymph% 9.4 (*)     All other components within normal limits   COMPREHENSIVE METABOLIC PANEL - Abnormal; Notable for the following components:    Potassium 3.4 (*)     Glucose 112 (*)     Calcium 8.5 (*)     Albumin 3.2 (*)     All other components within normal limits   CULTURE, BLOOD   CULTURE, BLOOD   TROPONIN I   B-TYPE NATRIURETIC PEPTIDE   INFLUENZA A AND B ANTIGEN   TROPONIN I   POCT LACTATE          Imaging Results          X-Ray Chest PA And Lateral (In process)                                    ED Course as of Feb 09 1613   Sun Feb 09, 2020   1554 Patient with pneumonia and failure of outpatient treatment.  Septic progress called initiated.  IV fluids given and broad-spectrum antibiotics started.  Hospital Medicine consult for evaluation for admission and further management.  Agree with plan and disposition and management.  I evaluated the patient myself along with the mid-level provider and examined the patient and agree with plan and management.        [UM]      ED Course User Index  [UM] Erasmo Salas MD     Agree with plan and disposition and management.  I evaluated the patient myself along with the  mid-level provider and examined the patient and agree with plan and management.             Clinical Impression:       ICD-10-CM ICD-9-CM   1. Pneumonia due to gram-positive bacteria J15.9 482.9   2. Chest pain R07.9 786.50   3. Pneumonia J18.9 486                             Erasmo Salas MD  02/09/20 1367

## 2020-02-09 NOTE — SUBJECTIVE & OBJECTIVE
Past Medical History:   Diagnosis Date    Hard of hearing        No past surgical history on file.    Review of patient's allergies indicates:  No Known Allergies    No current facility-administered medications on file prior to encounter.      Current Outpatient Medications on File Prior to Encounter   Medication Sig    [DISCONTINUED] ondansetron (ZOFRAN) 4 MG tablet Take 1 tablet (4 mg total) by mouth every 6 (six) hours as needed for Nausea (or vomiting).    [DISCONTINUED] RABEprazole (ACIPHEX) 20 mg tablet Take 1 tablet (20 mg total) by mouth once daily.    [DISCONTINUED] sucralfate (CARAFATE) 1 gram tablet Take 1 tablet (1 g total) by mouth 4 (four) times daily before meals and nightly.     Family History     None        Tobacco Use    Smoking status: Not on file   Substance and Sexual Activity    Alcohol use: Not on file    Drug use: Not on file    Sexual activity: Not on file     Review of Systems   Constitutional: Positive for chills, diaphoresis and fever.   HENT: Positive for congestion. Negative for postnasal drip, sinus pressure and sore throat.    Eyes: Negative for visual disturbance.   Respiratory: Positive for cough (productive) and wheezing. Negative for chest tightness and shortness of breath.    Cardiovascular: Positive for chest pain (with inspiration). Negative for palpitations and leg swelling.   Gastrointestinal: Negative for abdominal distention, abdominal pain, blood in stool, constipation, diarrhea, nausea and vomiting.   Endocrine: Negative.    Genitourinary: Negative for dysuria.   Musculoskeletal: Negative.    Skin: Negative.    Allergic/Immunologic: Negative.    Neurological: Negative for dizziness, weakness, numbness and headaches.   Hematological: Negative.    Psychiatric/Behavioral: Negative.      Objective:     Vital Signs (Most Recent):  Temp: 100.2 °F (37.9 °C) (02/09/20 1605)  Pulse: (!) 112 (02/09/20 1704)  Resp: 16 (02/09/20 1704)  BP: 127/69 (02/09/20 1704)  SpO2: 97 %  (02/09/20 1704) Vital Signs (24h Range):  Temp:  [100.2 °F (37.9 °C)-102.3 °F (39.1 °C)] 100.2 °F (37.9 °C)  Pulse:  [112-130] 112  Resp:  [16-30] 16  SpO2:  [95 %-97 %] 97 %  BP: (127-143)/(69-88) 127/69     Weight: 68 kg (150 lb)  Body mass index is 24.21 kg/m².    Physical Exam   Constitutional: He is oriented to person, place, and time. He appears well-developed and well-nourished. He is cooperative.  Non-toxic appearance. He appears ill. No distress.   HENT:   Head: Normocephalic and atraumatic.   Eyes: Pupils are equal, round, and reactive to light. Conjunctivae and lids are normal.   Neck: Trachea normal, normal range of motion and full passive range of motion without pain. Neck supple. Normal carotid pulses and no JVD present. No tracheal deviation present. No thyroid mass and no thyromegaly present.   Cardiovascular: Regular rhythm, S1 normal, S2 normal, normal heart sounds and normal pulses. Tachycardia present.   Pulmonary/Chest: Effort normal. No stridor. He has wheezes.   Abdominal: Soft. Normal appearance and bowel sounds are normal. There is no tenderness.   Musculoskeletal: Normal range of motion.   Neurological: He is alert and oriented to person, place, and time. He has normal strength. No cranial nerve deficit or sensory deficit.   Skin: Skin is warm, dry and intact. He is not diaphoretic. No cyanosis. Nails show no clubbing.   Psychiatric: He has a normal mood and affect. His speech is normal and behavior is normal. Judgment and thought content normal. Cognition and memory are normal.         CRANIAL NERVES     CN III, IV, VI   Pupils are equal, round, and reactive to light.       Significant Labs:   ABGs: No results for input(s): PH, PCO2, HCO3, POCSATURATED, BE, TOTALHB, COHB, METHB, O2HB, POCFIO2 in the last 48 hours.  Bilirubin:   Recent Labs   Lab 02/09/20  1500   BILITOT 0.7     Blood Culture: No results for input(s): LABBLOO in the last 48 hours.  BMP:   Recent Labs   Lab 02/09/20  1500    *      K 3.4*      CO2 26   BUN 17   CREATININE 0.8   CALCIUM 8.5*     CBC:   Recent Labs   Lab 02/09/20  1500   WBC 14.60*   HGB 8.8*   HCT 29.0*   *     CMP:   Recent Labs   Lab 02/09/20  1500      K 3.4*      CO2 26   *   BUN 17   CREATININE 0.8   CALCIUM 8.5*   PROT 7.3   ALBUMIN 3.2*   BILITOT 0.7   ALKPHOS 60   AST 37   ALT 36   ANIONGAP 10   EGFRNONAA >60.0     Cardiac Markers:   Recent Labs   Lab 02/09/20  1500   BNP 41     Coagulation: No results for input(s): PT, INR, APTT in the last 48 hours.  Lactic Acid: No results for input(s): LACTATE in the last 48 hours.  Lipase: No results for input(s): LIPASE in the last 48 hours.  Magnesium: No results for input(s): MG in the last 48 hours.  Troponin:   Recent Labs   Lab 02/09/20  1500   TROPONINI <0.030     TSH:   Recent Labs   Lab 10/16/19  0053   TSH 1.430     Urine Studies: No results for input(s): COLORU, APPEARANCEUA, PHUR, SPECGRAV, PROTEINUA, GLUCUA, KETONESU, BILIRUBINUA, OCCULTUA, NITRITE, UROBILINOGEN, LEUKOCYTESUR, RBCUA, WBCUA, BACTERIA, SQUAMEPITHEL, HYALINECASTS in the last 48 hours.    Invalid input(s): WRIGHTSUR    Significant Imaging: I have reviewed all pertinent imaging results/findings within the past 24 hours.     X-ray Chest Pa And Lateral    Result Date: 2/9/2020  EXAMINATION: XR CHEST PA AND LATERAL CLINICAL HISTORY: Chest Pain; COMPARISON: 05/11/2016 FINDINGS: Cardiac silhouette size is within normal limits.  Soft tissue hilar prominence is evident bilaterally.  Airspace disease involving the lateral aspect of the right lung base.  There is less pronounced airspace opacity involving the lateral aspect of the left lung base.  There is gas beneath the right hemidiaphragm which could be within the colon, although review of recent CT scan from October 2019 does not demonstrate colonic interposition.  No pneumothorax.  No large pleural effusion.     Lateral right lung base airspace disease  concerning for pneumonia.  Follow-up chest radiography is recommended. Gas beneath the right hemidiaphragm as discussed above.  Decubitus views of the abdomen are recommended to rule out free air. Electronically signed by: Jose Guadalupe Lu MD Date:    02/09/2020 Time:    16:49    X-ray Abdomen Flat And Erect    Result Date: 2/9/2020  EXAMINATION: XR ABDOMEN FLAT AND ERECT CLINICAL HISTORY: Pneumonia, unspecified organism FINDINGS: Supine and left lateral decubitus abdomen show no pneumoperitoneum.  Bowel gas pattern is nonobstructive.  No intra-abdominal mass effect organomegaly. Scattered vascular calcification incidentally noted.  No acute osseous abnormality.     No acute intra-abdominal abnormality. Electronically signed by: Sincere Ramos MD Date:    02/09/2020 Time:    17:35

## 2020-02-09 NOTE — ASSESSMENT & PLAN NOTE
Patient was counseled on smoking cessation and is currently ready to stop smoking.  Will have a nicotine transdermal patch applied while inpatient.  Will provide additional smoking cessation counseling prior to discharge.

## 2020-02-09 NOTE — HPI
Marcos Lynch is a 60 y.o. male with a history as  has a past medical history of Hard of hearing. who presented to the ED with a Flank Pain (RT , ONSET THIS AM, PAIN WORSE WITH BREATHING) and PAIN WITH RESPIRATIONS.  Patient report productive cough, subjective fever, chills and pleuritic chest pain (described as sharp and 9/10 PRS) with inspiration and SOB. Denies dizziness, HA, chest pain, palpitations, NVD, recent trauma or any other associated symptoms.  [facility] discharged on medications (regimen completed, unsure what medication her was discharged on).  Further reports symptoms improved, but then worsened.  Lab and imaging obtained and reviewed. CBC significant for elevated WBC and decreased H/H.  CXR showed lateral right lung base airspace disease concerning for pneumonia. Admitted to med-surg for failed outpatient therapy.

## 2020-02-10 PROBLEM — E87.6 HYPOKALEMIA: Status: RESOLVED | Noted: 2020-02-09 | Resolved: 2020-02-10

## 2020-02-10 LAB
ALBUMIN SERPL BCP-MCNC: 2.6 G/DL (ref 3.5–5.2)
ALP SERPL-CCNC: 49 U/L (ref 55–135)
ALT SERPL W/O P-5'-P-CCNC: 30 U/L (ref 10–44)
ANION GAP SERPL CALC-SCNC: 7 MMOL/L (ref 8–16)
ANION GAP SERPL CALC-SCNC: 7 MMOL/L (ref 8–16)
AST SERPL-CCNC: 28 U/L (ref 10–40)
BASOPHILS # BLD AUTO: 0.01 K/UL (ref 0–0.2)
BASOPHILS # BLD AUTO: 0.01 K/UL (ref 0–0.2)
BASOPHILS NFR BLD: 0.1 % (ref 0–1.9)
BASOPHILS NFR BLD: 0.1 % (ref 0–1.9)
BILIRUB SERPL-MCNC: 0.6 MG/DL (ref 0.1–1)
BUN SERPL-MCNC: 19 MG/DL (ref 6–20)
BUN SERPL-MCNC: 19 MG/DL (ref 6–20)
CALCIUM SERPL-MCNC: 8.4 MG/DL (ref 8.7–10.5)
CALCIUM SERPL-MCNC: 8.4 MG/DL (ref 8.7–10.5)
CHLORIDE SERPL-SCNC: 107 MMOL/L (ref 95–110)
CHLORIDE SERPL-SCNC: 107 MMOL/L (ref 95–110)
CO2 SERPL-SCNC: 24 MMOL/L (ref 23–29)
CO2 SERPL-SCNC: 24 MMOL/L (ref 23–29)
CREAT SERPL-MCNC: 0.9 MG/DL (ref 0.5–1.4)
CREAT SERPL-MCNC: 0.9 MG/DL (ref 0.5–1.4)
DIFFERENTIAL METHOD: ABNORMAL
DIFFERENTIAL METHOD: ABNORMAL
EOSINOPHIL # BLD AUTO: 0 K/UL (ref 0–0.5)
EOSINOPHIL # BLD AUTO: 0 K/UL (ref 0–0.5)
EOSINOPHIL NFR BLD: 0 % (ref 0–8)
EOSINOPHIL NFR BLD: 0 % (ref 0–8)
ERYTHROCYTE [DISTWIDTH] IN BLOOD BY AUTOMATED COUNT: 21.4 % (ref 11.5–14.5)
ERYTHROCYTE [DISTWIDTH] IN BLOOD BY AUTOMATED COUNT: 21.4 % (ref 11.5–14.5)
EST. GFR  (AFRICAN AMERICAN): >60 ML/MIN/1.73 M^2
EST. GFR  (AFRICAN AMERICAN): >60 ML/MIN/1.73 M^2
EST. GFR  (NON AFRICAN AMERICAN): >60 ML/MIN/1.73 M^2
EST. GFR  (NON AFRICAN AMERICAN): >60 ML/MIN/1.73 M^2
GLUCOSE SERPL-MCNC: 163 MG/DL (ref 70–110)
GLUCOSE SERPL-MCNC: 163 MG/DL (ref 70–110)
HCT VFR BLD AUTO: 26.8 % (ref 40–54)
HCT VFR BLD AUTO: 26.8 % (ref 40–54)
HGB BLD-MCNC: 7.9 G/DL (ref 14–18)
HGB BLD-MCNC: 7.9 G/DL (ref 14–18)
IMM GRANULOCYTES # BLD AUTO: 0.28 K/UL (ref 0–0.04)
IMM GRANULOCYTES # BLD AUTO: 0.28 K/UL (ref 0–0.04)
IMM GRANULOCYTES NFR BLD AUTO: 1.8 % (ref 0–0.5)
IMM GRANULOCYTES NFR BLD AUTO: 1.8 % (ref 0–0.5)
LYMPHOCYTES # BLD AUTO: 1 K/UL (ref 1–4.8)
LYMPHOCYTES # BLD AUTO: 1 K/UL (ref 1–4.8)
LYMPHOCYTES NFR BLD: 6.4 % (ref 18–48)
LYMPHOCYTES NFR BLD: 6.4 % (ref 18–48)
MAGNESIUM SERPL-MCNC: 1.7 MG/DL (ref 1.6–2.6)
MCH RBC QN AUTO: 20.6 PG (ref 27–31)
MCH RBC QN AUTO: 20.6 PG (ref 27–31)
MCHC RBC AUTO-ENTMCNC: 29.5 G/DL (ref 32–36)
MCHC RBC AUTO-ENTMCNC: 29.5 G/DL (ref 32–36)
MCV RBC AUTO: 70 FL (ref 82–98)
MCV RBC AUTO: 70 FL (ref 82–98)
MONOCYTES # BLD AUTO: 0.5 K/UL (ref 0.3–1)
MONOCYTES # BLD AUTO: 0.5 K/UL (ref 0.3–1)
MONOCYTES NFR BLD: 2.8 % (ref 4–15)
MONOCYTES NFR BLD: 2.8 % (ref 4–15)
NEUTROPHILS # BLD AUTO: 14.1 K/UL (ref 1.8–7.7)
NEUTROPHILS # BLD AUTO: 14.1 K/UL (ref 1.8–7.7)
NEUTROPHILS NFR BLD: 88.9 % (ref 38–73)
NEUTROPHILS NFR BLD: 88.9 % (ref 38–73)
NRBC BLD-RTO: 0 /100 WBC
NRBC BLD-RTO: 0 /100 WBC
PHOSPHATE SERPL-MCNC: 2.3 MG/DL (ref 2.7–4.5)
PLATELET # BLD AUTO: 464 K/UL (ref 150–350)
PLATELET # BLD AUTO: 464 K/UL (ref 150–350)
PMV BLD AUTO: 8.5 FL (ref 9.2–12.9)
PMV BLD AUTO: 8.5 FL (ref 9.2–12.9)
POTASSIUM SERPL-SCNC: 4.1 MMOL/L (ref 3.5–5.1)
POTASSIUM SERPL-SCNC: 4.1 MMOL/L (ref 3.5–5.1)
PROT SERPL-MCNC: 6.6 G/DL (ref 6–8.4)
RBC # BLD AUTO: 3.83 M/UL (ref 4.6–6.2)
RBC # BLD AUTO: 3.83 M/UL (ref 4.6–6.2)
SODIUM SERPL-SCNC: 138 MMOL/L (ref 136–145)
SODIUM SERPL-SCNC: 138 MMOL/L (ref 136–145)
WBC # BLD AUTO: 15.89 K/UL (ref 3.9–12.7)
WBC # BLD AUTO: 15.89 K/UL (ref 3.9–12.7)

## 2020-02-10 PROCEDURE — 94640 AIRWAY INHALATION TREATMENT: CPT

## 2020-02-10 PROCEDURE — 85025 COMPLETE CBC W/AUTO DIFF WBC: CPT

## 2020-02-10 PROCEDURE — 83735 ASSAY OF MAGNESIUM: CPT

## 2020-02-10 PROCEDURE — 25000003 PHARM REV CODE 250: Performed by: INTERNAL MEDICINE

## 2020-02-10 PROCEDURE — 12000002 HC ACUTE/MED SURGE SEMI-PRIVATE ROOM

## 2020-02-10 PROCEDURE — 84100 ASSAY OF PHOSPHORUS: CPT

## 2020-02-10 PROCEDURE — S4991 NICOTINE PATCH NONLEGEND: HCPCS | Performed by: NURSE PRACTITIONER

## 2020-02-10 PROCEDURE — 25000242 PHARM REV CODE 250 ALT 637 W/ HCPCS: Performed by: INTERNAL MEDICINE

## 2020-02-10 PROCEDURE — 25000003 PHARM REV CODE 250: Performed by: NURSE PRACTITIONER

## 2020-02-10 PROCEDURE — 36415 COLL VENOUS BLD VENIPUNCTURE: CPT

## 2020-02-10 PROCEDURE — 80053 COMPREHEN METABOLIC PANEL: CPT

## 2020-02-10 PROCEDURE — 63600175 PHARM REV CODE 636 W HCPCS: Performed by: NURSE PRACTITIONER

## 2020-02-10 PROCEDURE — 63600175 PHARM REV CODE 636 W HCPCS: Performed by: INTERNAL MEDICINE

## 2020-02-10 PROCEDURE — 94761 N-INVAS EAR/PLS OXIMETRY MLT: CPT

## 2020-02-10 RX ORDER — PANTOPRAZOLE SODIUM 40 MG/1
40 TABLET, DELAYED RELEASE ORAL DAILY
Status: DISCONTINUED | OUTPATIENT
Start: 2020-02-10 | End: 2020-02-13 | Stop reason: HOSPADM

## 2020-02-10 RX ORDER — SODIUM CHLORIDE 9 MG/ML
INJECTION, SOLUTION INTRAVENOUS CONTINUOUS
Status: DISCONTINUED | OUTPATIENT
Start: 2020-02-10 | End: 2020-02-11

## 2020-02-10 RX ORDER — FLUTICASONE PROPIONATE 50 MCG
2 SPRAY, SUSPENSION (ML) NASAL DAILY
Status: DISCONTINUED | OUTPATIENT
Start: 2020-02-10 | End: 2020-02-13 | Stop reason: HOSPADM

## 2020-02-10 RX ORDER — GUAIFENESIN 100 MG/5ML
200 SOLUTION ORAL EVERY 4 HOURS PRN
Status: DISCONTINUED | OUTPATIENT
Start: 2020-02-10 | End: 2020-02-13 | Stop reason: HOSPADM

## 2020-02-10 RX ADMIN — NICOTINE 1 PATCH: 21 PATCH, EXTENDED RELEASE TRANSDERMAL at 11:02

## 2020-02-10 RX ADMIN — LEVALBUTEROL HYDROCHLORIDE 0.63 MG: 0.63 SOLUTION RESPIRATORY (INHALATION) at 07:02

## 2020-02-10 RX ADMIN — PANTOPRAZOLE SODIUM 40 MG: 40 TABLET, DELAYED RELEASE ORAL at 07:02

## 2020-02-10 RX ADMIN — METHYLPREDNISOLONE SODIUM SUCCINATE 80 MG: 125 INJECTION, POWDER, FOR SOLUTION INTRAMUSCULAR; INTRAVENOUS at 01:02

## 2020-02-10 RX ADMIN — METHYLPREDNISOLONE SODIUM SUCCINATE 80 MG: 125 INJECTION, POWDER, FOR SOLUTION INTRAMUSCULAR; INTRAVENOUS at 05:02

## 2020-02-10 RX ADMIN — FLUTICASONE PROPIONATE 100 MCG: 50 SPRAY, METERED NASAL at 08:02

## 2020-02-10 RX ADMIN — MONTELUKAST 10 MG: 10 TABLET, FILM COATED ORAL at 08:02

## 2020-02-10 RX ADMIN — SODIUM CHLORIDE: 0.9 INJECTION, SOLUTION INTRAVENOUS at 06:02

## 2020-02-10 RX ADMIN — LEVALBUTEROL HYDROCHLORIDE 0.63 MG: 0.63 SOLUTION RESPIRATORY (INHALATION) at 02:02

## 2020-02-10 RX ADMIN — LEVOFLOXACIN 750 MG: 750 INJECTION, SOLUTION INTRAVENOUS at 07:02

## 2020-02-10 RX ADMIN — CEFTRIAXONE 1 G: 1 INJECTION, SOLUTION INTRAVENOUS at 05:02

## 2020-02-10 NOTE — PLAN OF CARE
Problem: Adult Inpatient Plan of Care  Goal: Plan of Care Review  2/10/2020 1340 by Charley Greene RN  Outcome: Ongoing, Progressing  2/10/2020 1140 by Charley Greene RN  Outcome: Ongoing, Progressing  Goal: Patient-Specific Goal (Individualization)  2/10/2020 1340 by Charley Greene RN  Outcome: Ongoing, Progressing  2/10/2020 1140 by Charley Greene RN  Outcome: Ongoing, Progressing  Goal: Absence of Hospital-Acquired Illness or Injury  2/10/2020 1340 by Charley Greene RN  Outcome: Ongoing, Progressing  2/10/2020 1140 by Charley Greene RN  Outcome: Ongoing, Progressing  Goal: Optimal Comfort and Wellbeing  2/10/2020 1340 by Charley Greene RN  Outcome: Ongoing, Progressing  2/10/2020 1140 by Charley Greene RN  Outcome: Ongoing, Progressing  Goal: Readiness for Transition of Care  2/10/2020 1340 by Charley Greene RN  Outcome: Ongoing, Progressing  2/10/2020 1140 by Charley Greene RN  Outcome: Ongoing, Progressing  Goal: Rounds/Family Conference  2/10/2020 1340 by Charley Greene RN  Outcome: Ongoing, Progressing  2/10/2020 1140 by Charley Greene RN  Outcome: Ongoing, Progressing

## 2020-02-10 NOTE — PLAN OF CARE
02/10/20 0723   Patient Assessment/Suction   Level of Consciousness (AVPU) alert   Respiratory Effort Normal;Unlabored   All Lung Fields Breath Sounds crackles   PRE-TX-O2   O2 Device (Oxygen Therapy) room air   SpO2 95 %   Pulse Oximetry Type Intermittent   $ Pulse Oximetry - Multiple Charge Pulse Oximetry - Multiple   Pulse 94   Resp 16   Aerosol Therapy   $ Aerosol Therapy Charges Aerosol Treatment   Daily Review of Necessity (SVN) completed   Respiratory Treatment Status (SVN) given   Treatment Route (SVN) mask   Patient Position (SVN) HOB elevated   Post Treatment Assessment (SVN) breath sounds improved   Signs of Intolerance (SVN) none   Breath Sounds Post-Respiratory Treatment   Throughout All Fields Post-Treatment All Fields   Throughout All Fields Post-Treatment aeration increased   Post-treatment Heart Rate (beats/min) 98   Post-treatment Resp Rate (breaths/min) 17

## 2020-02-10 NOTE — PLAN OF CARE
02/10/20 1048   Discharge Assessment   Assessment Type Discharge Planning Assessment   Confirmed/corrected address and phone number on facesheet? Yes   Assessment information obtained from? Patient   Communicated expected length of stay with patient/caregiver no   Prior to hospitilization cognitive status: Alert/Oriented   Prior to hospitalization functional status: Independent   Current cognitive status: Alert/Oriented   Current Functional Status: Independent   Lives With alone   Able to Return to Prior Arrangements yes   Is patient able to care for self after discharge? Yes   Patient's perception of discharge disposition home or selfcare   Readmission Within the Last 30 Days no previous admission in last 30 days   Patient currently being followed by outpatient case management? No   Patient currently receives any other outside agency services? No   Equipment Currently Used at Home none   Do you have any problems affording any of your prescribed medications? No   Is the patient taking medications as prescribed? yes   Does the patient have transportation home? Yes   Transportation Anticipated family or friend will provide   Does the patient receive services at the [facility]? No   Discharge Plan A Home   DME Needed Upon Discharge  none   Patient/Family in Agreement with Plan yes       Introduced self to patient asked if ok to take a few min of their time for short interview for D/C planning and ok with patient

## 2020-02-10 NOTE — ASSESSMENT & PLAN NOTE
HGB/HCT stable, however, with significant drop since 10/2019. No evidence of overt bleeding.   Stool for occult bleeding, repeat CBC for confirmation  Monitor  CBC in AM

## 2020-02-10 NOTE — RESPIRATORY THERAPY
02/09/20 2224   Patient Assessment/Suction   Level of Consciousness (AVPU) alert   Respiratory Effort Normal;Unlabored   Expansion/Accessory Muscles/Retractions expansion symmetric;no retractions;no use of accessory muscles   All Lung Fields Breath Sounds wheezes, expiratory   LLL Breath Sounds wheezes, expiratory   Cough Type dry;good;nonproductive   PRE-TX-O2   O2 Device (Oxygen Therapy) nasal cannula   Flow (L/min) 0   SpO2 96 %   Pulse Oximetry Type Intermittent   $ Pulse Oximetry - Multiple Charge Pulse Oximetry - Multiple   Pulse 98   Resp 20   Aerosol Therapy   $ Aerosol Therapy Charges Aerosol Treatment;Mouth rinsed post treatment   Daily Review of Necessity (SVN) completed   Respiratory Treatment Status (SVN) given   Treatment Route (SVN) mask;oxygen   Patient Position (SVN) sitting on edge of bed   Post Treatment Assessment (SVN) increased aeration;patient reports breathing improved   Signs of Intolerance (SVN) none   Breath Sounds Post-Respiratory Treatment   Throughout All Fields Post-Treatment All Fields   Throughout All Fields Post-Treatment aeration increased   Post-treatment Heart Rate (beats/min) 98   Post-treatment Resp Rate (breaths/min) 18   Respiratory Interventions   Cough And Deep Breathing done with encouragement   Breathing Techniques/Airway Clearance deep/controlled cough encouraged;diaphragmatic breathing promoted   Respiratory Evaluation   $ Care Plan Tech Time 15 min   Evaluation For New Orders   Admitting Diagnosis Pneumonia   Home Oxygen   Has Home Oxygen? No   Home Aerosol, MDI, DPI, and Other Treatments/Therapies   Home Respiratory Therapy Per Patient/Review of Chart No

## 2020-02-11 LAB
ALBUMIN SERPL BCP-MCNC: 2.4 G/DL (ref 3.5–5.2)
ALP SERPL-CCNC: 44 U/L (ref 55–135)
ALT SERPL W/O P-5'-P-CCNC: 24 U/L (ref 10–44)
ANION GAP SERPL CALC-SCNC: 8 MMOL/L (ref 8–16)
ANION GAP SERPL CALC-SCNC: 8 MMOL/L (ref 8–16)
ANISOCYTOSIS BLD QL SMEAR: SLIGHT
AST SERPL-CCNC: 20 U/L (ref 10–40)
BASOPHILS # BLD AUTO: 0.03 K/UL (ref 0–0.2)
BASOPHILS NFR BLD: 0.1 % (ref 0–1.9)
BILIRUB SERPL-MCNC: 0.3 MG/DL (ref 0.1–1)
BUN SERPL-MCNC: 19 MG/DL (ref 6–20)
BUN SERPL-MCNC: 19 MG/DL (ref 6–20)
CALCIUM SERPL-MCNC: 8.3 MG/DL (ref 8.7–10.5)
CALCIUM SERPL-MCNC: 8.3 MG/DL (ref 8.7–10.5)
CHLORIDE SERPL-SCNC: 106 MMOL/L (ref 95–110)
CHLORIDE SERPL-SCNC: 106 MMOL/L (ref 95–110)
CO2 SERPL-SCNC: 24 MMOL/L (ref 23–29)
CO2 SERPL-SCNC: 24 MMOL/L (ref 23–29)
CREAT SERPL-MCNC: 0.8 MG/DL (ref 0.5–1.4)
CREAT SERPL-MCNC: 0.8 MG/DL (ref 0.5–1.4)
DIFFERENTIAL METHOD: ABNORMAL
EOSINOPHIL # BLD AUTO: 0 K/UL (ref 0–0.5)
EOSINOPHIL NFR BLD: 0 % (ref 0–8)
ERYTHROCYTE [DISTWIDTH] IN BLOOD BY AUTOMATED COUNT: 21.2 % (ref 11.5–14.5)
EST. GFR  (AFRICAN AMERICAN): >60 ML/MIN/1.73 M^2
EST. GFR  (AFRICAN AMERICAN): >60 ML/MIN/1.73 M^2
EST. GFR  (NON AFRICAN AMERICAN): >60 ML/MIN/1.73 M^2
EST. GFR  (NON AFRICAN AMERICAN): >60 ML/MIN/1.73 M^2
FERRITIN SERPL-MCNC: 46 NG/ML (ref 20–300)
GLUCOSE SERPL-MCNC: 138 MG/DL (ref 70–110)
GLUCOSE SERPL-MCNC: 138 MG/DL (ref 70–110)
HCT VFR BLD AUTO: 24.4 % (ref 40–54)
HGB BLD-MCNC: 7.4 G/DL (ref 14–18)
HYPOCHROMIA BLD QL SMEAR: ABNORMAL
IMM GRANULOCYTES # BLD AUTO: 0.31 K/UL (ref 0–0.04)
IMM GRANULOCYTES NFR BLD AUTO: 1.4 % (ref 0–0.5)
IRON SERPL-MCNC: 12 UG/DL (ref 45–160)
LYMPHOCYTES # BLD AUTO: 1.1 K/UL (ref 1–4.8)
LYMPHOCYTES NFR BLD: 5 % (ref 18–48)
MAGNESIUM SERPL-MCNC: 1.7 MG/DL (ref 1.6–2.6)
MCH RBC QN AUTO: 20.8 PG (ref 27–31)
MCHC RBC AUTO-ENTMCNC: 30.3 G/DL (ref 32–36)
MCV RBC AUTO: 69 FL (ref 82–98)
MONOCYTES # BLD AUTO: 1.1 K/UL (ref 0.3–1)
MONOCYTES NFR BLD: 4.6 % (ref 4–15)
NEUTROPHILS # BLD AUTO: 20.1 K/UL (ref 1.8–7.7)
NEUTROPHILS NFR BLD: 88.9 % (ref 38–73)
NRBC BLD-RTO: 0 /100 WBC
PHOSPHATE SERPL-MCNC: 3.2 MG/DL (ref 2.7–4.5)
PLATELET # BLD AUTO: 464 K/UL (ref 150–350)
PMV BLD AUTO: 8.4 FL (ref 9.2–12.9)
POTASSIUM SERPL-SCNC: 3.8 MMOL/L (ref 3.5–5.1)
POTASSIUM SERPL-SCNC: 3.8 MMOL/L (ref 3.5–5.1)
PROT SERPL-MCNC: 6 G/DL (ref 6–8.4)
RBC # BLD AUTO: 3.56 M/UL (ref 4.6–6.2)
SATURATED IRON: 4 % (ref 20–50)
SODIUM SERPL-SCNC: 138 MMOL/L (ref 136–145)
SODIUM SERPL-SCNC: 138 MMOL/L (ref 136–145)
TOTAL IRON BINDING CAPACITY: 283 UG/DL (ref 250–450)
TRANSFERRIN SERPL-MCNC: 202 MG/DL (ref 200–375)
TRANSFERRIN SERPL-MCNC: 202 MG/DL (ref 200–375)
WBC # BLD AUTO: 22.62 K/UL (ref 3.9–12.7)

## 2020-02-11 PROCEDURE — 25000003 PHARM REV CODE 250: Performed by: INTERNAL MEDICINE

## 2020-02-11 PROCEDURE — 25000242 PHARM REV CODE 250 ALT 637 W/ HCPCS: Performed by: INTERNAL MEDICINE

## 2020-02-11 PROCEDURE — S4991 NICOTINE PATCH NONLEGEND: HCPCS | Performed by: NURSE PRACTITIONER

## 2020-02-11 PROCEDURE — 36415 COLL VENOUS BLD VENIPUNCTURE: CPT

## 2020-02-11 PROCEDURE — 94761 N-INVAS EAR/PLS OXIMETRY MLT: CPT

## 2020-02-11 PROCEDURE — 63600175 PHARM REV CODE 636 W HCPCS: Performed by: INTERNAL MEDICINE

## 2020-02-11 PROCEDURE — 82728 ASSAY OF FERRITIN: CPT

## 2020-02-11 PROCEDURE — 12000002 HC ACUTE/MED SURGE SEMI-PRIVATE ROOM

## 2020-02-11 PROCEDURE — 80053 COMPREHEN METABOLIC PANEL: CPT

## 2020-02-11 PROCEDURE — 25000003 PHARM REV CODE 250: Performed by: NURSE PRACTITIONER

## 2020-02-11 PROCEDURE — 63600175 PHARM REV CODE 636 W HCPCS: Performed by: NURSE PRACTITIONER

## 2020-02-11 PROCEDURE — 83540 ASSAY OF IRON: CPT

## 2020-02-11 PROCEDURE — 84100 ASSAY OF PHOSPHORUS: CPT

## 2020-02-11 PROCEDURE — 94640 AIRWAY INHALATION TREATMENT: CPT

## 2020-02-11 PROCEDURE — 83735 ASSAY OF MAGNESIUM: CPT

## 2020-02-11 PROCEDURE — 85025 COMPLETE CBC W/AUTO DIFF WBC: CPT

## 2020-02-11 RX ADMIN — MONTELUKAST 10 MG: 10 TABLET, FILM COATED ORAL at 09:02

## 2020-02-11 RX ADMIN — LEVALBUTEROL HYDROCHLORIDE 0.63 MG: 0.63 SOLUTION RESPIRATORY (INHALATION) at 07:02

## 2020-02-11 RX ADMIN — FLUTICASONE PROPIONATE 100 MCG: 50 SPRAY, METERED NASAL at 09:02

## 2020-02-11 RX ADMIN — NICOTINE 1 PATCH: 21 PATCH, EXTENDED RELEASE TRANSDERMAL at 09:02

## 2020-02-11 RX ADMIN — LEVOFLOXACIN 750 MG: 750 INJECTION, SOLUTION INTRAVENOUS at 07:02

## 2020-02-11 RX ADMIN — SODIUM CHLORIDE 125 MG: 9 INJECTION, SOLUTION INTRAVENOUS at 12:02

## 2020-02-11 RX ADMIN — POTASSIUM CHLORIDE 20 MEQ: 20 TABLET, EXTENDED RELEASE ORAL at 09:02

## 2020-02-11 RX ADMIN — TRAZODONE HYDROCHLORIDE 25 MG: 50 TABLET ORAL at 09:02

## 2020-02-11 RX ADMIN — ACETAMINOPHEN 650 MG: 325 TABLET ORAL at 04:02

## 2020-02-11 RX ADMIN — PANTOPRAZOLE SODIUM 40 MG: 40 TABLET, DELAYED RELEASE ORAL at 06:02

## 2020-02-11 RX ADMIN — TRAZODONE HYDROCHLORIDE 25 MG: 50 TABLET ORAL at 03:02

## 2020-02-11 RX ADMIN — LEVALBUTEROL HYDROCHLORIDE 0.63 MG: 0.63 SOLUTION RESPIRATORY (INHALATION) at 01:02

## 2020-02-11 RX ADMIN — MAGNESIUM OXIDE 800 MG: 400 TABLET ORAL at 09:02

## 2020-02-11 NOTE — ASSESSMENT & PLAN NOTE
Patient was counseled on smoking cessation and is currently ready to stop smoking  Ordered nicotine transdermal patch

## 2020-02-11 NOTE — ASSESSMENT & PLAN NOTE
Lower than baseline hemoglobin    Stool for occult bleeding pending   Obtain iron panel   Monitor with daily CBC

## 2020-02-11 NOTE — SUBJECTIVE & OBJECTIVE
Interval History:  Admitted for right upper quadrant pain which is worse with deep breathing. Has associated minimally productive cough.  States that he was [facility] and was discharged the same day with what appears to be Medrol Dosepak and antitussives.  Unclear if patient was ever on antibiotics.  He admits to subjective fever and chills.  No nausea or vomiting.  Has occasional wheezing.  Ongoing tobacco use.    Objective:     Vital Signs (Most Recent):  Temp: 98.9 °F (37.2 °C) (02/10/20 1740)  Pulse: (!) 115 (02/10/20 1740)  Resp: 18 (02/10/20 1740)  BP: 130/73 (02/10/20 1740)  SpO2: 96 % (02/10/20 1740) Vital Signs (24h Range):  Temp:  [97.7 °F (36.5 °C)-98.9 °F (37.2 °C)] 98.9 °F (37.2 °C)  Pulse:  [] 115  Resp:  [16-20] 18  SpO2:  [94 %-97 %] 96 %  BP: (109-131)/(69-78) 130/73     Weight: 72.8 kg (160 lb 7.9 oz)  Body mass index is 25.9 kg/m².    Intake/Output Summary (Last 24 hours) at 2/10/2020 1802  Last data filed at 2/10/2020 1700  Gross per 24 hour   Intake 600 ml   Output --   Net 600 ml      Physical Exam   Constitutional: He is oriented to person, place, and time. He appears well-developed and well-nourished. No distress.   HENT:   Head: Normocephalic and atraumatic.   Mouth/Throat: No oropharyngeal exudate.   Eyes: Conjunctivae are normal. No scleral icterus.   Neck: Neck supple. No thyromegaly present.   Cardiovascular: Regular rhythm and normal heart sounds. Tachycardia present.   No murmur heard.  Pulmonary/Chest: Effort normal. No tachypnea. No respiratory distress. He has wheezes. He has no rales.   Abdominal: Soft. Bowel sounds are normal. He exhibits no distension. There is no tenderness.   Musculoskeletal: He exhibits no edema or deformity.   Neurological: He is alert and oriented to person, place, and time. He has normal strength. No sensory deficit.   Skin: Skin is warm. Capillary refill takes less than 2 seconds. No rash noted.   Psychiatric: He  has a normal mood and affect. His behavior is normal.   Nursing note and vitals reviewed.      Significant Labs:   CBC:   Recent Labs   Lab 02/09/20  1500 02/10/20  0454   WBC 14.60* 15.89*  15.89*   HGB 8.8* 7.9*  7.9*   HCT 29.0* 26.8*  26.8*   * 464*  464*     CMP:   Recent Labs   Lab 02/09/20  1500 02/10/20  0454    138  138   K 3.4* 4.1  4.1    107  107   CO2 26 24  24   * 163*  163*   BUN 17 19  19   CREATININE 0.8 0.9  0.9   CALCIUM 8.5* 8.4*  8.4*   PROT 7.3 6.6   ALBUMIN 3.2* 2.6*   BILITOT 0.7 0.6   ALKPHOS 60 49*   AST 37 28   ALT 36 30   ANIONGAP 10 7*  7*   EGFRNONAA >60.0 >60.0  >60.0       Significant Imaging: I have reviewed all pertinent imaging results/findings within the past 24 hours.     Imaging Results          X-Ray Abdomen Flat And Erect (Final result)  Result time 02/09/20 17:35:29    Final result by Sincere Ramos MD (02/09/20 17:35:29)                 Impression:      No acute intra-abdominal abnormality.      Electronically signed by: Sincere Ramos MD  Date:    02/09/2020  Time:    17:35             Narrative:    EXAMINATION:  XR ABDOMEN FLAT AND ERECT    CLINICAL HISTORY:  Pneumonia, unspecified organism    FINDINGS:  Supine and left lateral decubitus abdomen show no pneumoperitoneum.  Bowel gas pattern is nonobstructive.  No intra-abdominal mass effect organomegaly.    Scattered vascular calcification incidentally noted.  No acute osseous abnormality.                               X-Ray Chest PA And Lateral (Final result)  Result time 02/09/20 16:49:38    Final result by Jose Guadalupe Lu MD (02/09/20 16:49:38)                 Impression:      Lateral right lung base airspace disease concerning for pneumonia.  Follow-up chest radiography is recommended.    Gas beneath the right hemidiaphragm as discussed above.  Decubitus views of the abdomen are recommended to rule out free air.      Electronically signed by: Jose Guadalupe Lu  MD  Date:    02/09/2020  Time:    16:49             Narrative:    EXAMINATION:  XR CHEST PA AND LATERAL    CLINICAL HISTORY:  Chest Pain;    COMPARISON:  05/11/2016    FINDINGS:  Cardiac silhouette size is within normal limits.  Soft tissue hilar prominence is evident bilaterally.  Airspace disease involving the lateral aspect of the right lung base.  There is less pronounced airspace opacity involving the lateral aspect of the left lung base.  There is gas beneath the right hemidiaphragm which could be within the colon, although review of recent CT scan from October 2019 does not demonstrate colonic interposition.  No pneumothorax.  No large pleural effusion.

## 2020-02-11 NOTE — ASSESSMENT & PLAN NOTE
CXR concerning for right lower lobe pneumonia  Supplemental oxygen, wean as tolerated  Scheduled breathing treatments  D/c IV steroids  Start IV fluids given associated tachycardia (?dehydration)  Continue IV abx with levofloxacin   Follow blood and sputum cx

## 2020-02-11 NOTE — CARE UPDATE
Readmission prevention      DX PNA-ANGIE dx.     Pt was provided disease process information, along with a [place] CS &R list, discharge planning recommendations, and information to ACCESS/HCR should he need it. [facility]. Pt reports no issues at this time with either appts nor medications.  Pt was encouraged to get his medical records so he will know what he is diagnosed with and can ask questions about it.  Pt was encouraged to ask questions while in hospital and given PC contact information for additional resources upon discharge.      Andra BLUNT, LPN    Transitions of Care Program   2/11/2020, 3:30 pm

## 2020-02-11 NOTE — PLAN OF CARE
02/11/20 0729   Patient Assessment/Suction   Level of Consciousness (AVPU) alert   Respiratory Effort Normal;Unlabored   Expansion/Accessory Muscles/Retractions no use of accessory muscles   All Lung Fields Breath Sounds wheezes, expiratory   Rhythm/Pattern, Respiratory no shortness of breath reported   PRE-TX-O2   O2 Device (Oxygen Therapy) room air   SpO2 99 %   Pulse Oximetry Type Intermittent   $ Pulse Oximetry - Multiple Charge Pulse Oximetry - Multiple   Pulse 80   Resp 17   Aerosol Therapy   $ Aerosol Therapy Charges Aerosol Treatment   Respiratory Treatment Status (SVN) given   Treatment Route (SVN) mask;oxygen   Patient Position (SVN) HOB elevated   Post Treatment Assessment (SVN) increased aeration;patient reports breathing improved;vital signs unchanged;wheezing decreased   Signs of Intolerance (SVN) none   Breath Sounds Post-Respiratory Treatment   Post-treatment Heart Rate (beats/min) 86   Post-treatment Resp Rate (breaths/min) 17

## 2020-02-11 NOTE — PROGRESS NOTES
[facility] Medicine  Progress Note    Patient Name: Mracos Lynch  MRN: [id number]  Patient Class: IP- Inpatient   Admission Date: 2/9/2020  Length of Stay: 1 days  Attending Physician: Lazaro Ferreira MD  Primary Care Provider: Primary Doctor No        Subjective:     Principal Problem:Community acquired pneumonia of right lung      Interval History:  Admitted for right upper quadrant pain which is worse with deep breathing. Has associated minimally productive cough.  States that [facility] and was discharged the same day with what appears to be Medrol Dosepak and antitussives.  Unclear if patient was ever on antibiotics.  He admits to subjective fever and chills.  No nausea or vomiting.  Has occasional wheezing.  Ongoing tobacco use.    Objective:     Vital Signs (Most Recent):  Temp: 98.9 °F (37.2 °C) (02/10/20 1740)  Pulse: (!) 115 (02/10/20 1740)  Resp: 18 (02/10/20 1740)  BP: 130/73 (02/10/20 1740)  SpO2: 96 % (02/10/20 1740) Vital Signs (24h Range):  Temp:  [97.7 °F (36.5 °C)-98.9 °F (37.2 °C)] 98.9 °F (37.2 °C)  Pulse:  [] 115  Resp:  [16-20] 18  SpO2:  [94 %-97 %] 96 %  BP: (109-131)/(69-78) 130/73     Weight: 72.8 kg (160 lb 7.9 oz)  Body mass index is 25.9 kg/m².    Intake/Output Summary (Last 24 hours) at 2/10/2020 1802  Last data filed at 2/10/2020 1700  Gross per 24 hour   Intake 600 ml   Output --   Net 600 ml      Physical Exam   Constitutional: He is oriented to person, place, and time. He appears well-developed and well-nourished. No distress.   HENT:   Head: Normocephalic and atraumatic.   Mouth/Throat: No oropharyngeal exudate.   Eyes: Conjunctivae are normal. No scleral icterus.   Neck: Neck supple. No thyromegaly present.   Cardiovascular: Regular rhythm and normal heart sounds. Tachycardia present.   No murmur heard.  Pulmonary/Chest: Effort normal. No tachypnea. No respiratory distress. He has wheezes. He has no  rales.   Abdominal: Soft. Bowel sounds are normal. He exhibits no distension. There is no tenderness.   Musculoskeletal: He exhibits no edema or deformity.   Neurological: He is alert and oriented to person, place, and time. He has normal strength. No sensory deficit.   Skin: Skin is warm. Capillary refill takes less than 2 seconds. No rash noted.   Psychiatric: He has a normal mood and affect. His behavior is normal.   Nursing note and vitals reviewed.      Significant Labs:   CBC:   Recent Labs   Lab 02/09/20  1500 02/10/20  0454   WBC 14.60* 15.89*  15.89*   HGB 8.8* 7.9*  7.9*   HCT 29.0* 26.8*  26.8*   * 464*  464*     CMP:   Recent Labs   Lab 02/09/20  1500 02/10/20  0454    138  138   K 3.4* 4.1  4.1    107  107   CO2 26 24  24   * 163*  163*   BUN 17 19  19   CREATININE 0.8 0.9  0.9   CALCIUM 8.5* 8.4*  8.4*   PROT 7.3 6.6   ALBUMIN 3.2* 2.6*   BILITOT 0.7 0.6   ALKPHOS 60 49*   AST 37 28   ALT 36 30   ANIONGAP 10 7*  7*   EGFRNONAA >60.0 >60.0  >60.0       Significant Imaging: I have reviewed all pertinent imaging results/findings within the past 24 hours.     Imaging Results          X-Ray Abdomen Flat And Erect (Final result)  Result time 02/09/20 17:35:29    Final result by Sincere Ramos MD (02/09/20 17:35:29)                 Impression:      No acute intra-abdominal abnormality.      Electronically signed by: Sincere Ramos MD  Date:    02/09/2020  Time:    17:35             Narrative:    EXAMINATION:  XR ABDOMEN FLAT AND ERECT    CLINICAL HISTORY:  Pneumonia, unspecified organism    FINDINGS:  Supine and left lateral decubitus abdomen show no pneumoperitoneum.  Bowel gas pattern is nonobstructive.  No intra-abdominal mass effect organomegaly.    Scattered vascular calcification incidentally noted.  No acute osseous abnormality.                               X-Ray Chest PA And Lateral (Final result)  Result time 02/09/20 16:49:38    Final result by Jose Guadalupe Lu,  MD (02/09/20 16:49:38)                 Impression:      Lateral right lung base airspace disease concerning for pneumonia.  Follow-up chest radiography is recommended.    Gas beneath the right hemidiaphragm as discussed above.  Decubitus views of the abdomen are recommended to rule out free air.      Electronically signed by: Jose Guadalupe Lu MD  Date:    02/09/2020  Time:    16:49             Narrative:    EXAMINATION:  XR CHEST PA AND LATERAL    CLINICAL HISTORY:  Chest Pain;    COMPARISON:  05/11/2016    FINDINGS:  Cardiac silhouette size is within normal limits.  Soft tissue hilar prominence is evident bilaterally.  Airspace disease involving the lateral aspect of the right lung base.  There is less pronounced airspace opacity involving the lateral aspect of the left lung base.  There is gas beneath the right hemidiaphragm which could be within the colon, although review of recent CT scan from October 2019 does not demonstrate colonic interposition.  No pneumothorax.  No large pleural effusion.                                    Assessment/Plan:      * Community acquired pneumonia of right lung  CXR concerning for right lower lobe pneumonia  Supplemental oxygen, wean as tolerated  Scheduled breathing treatments  D/c IV steroids  Start IV fluids given associated tachycardia (?dehydration)  Continue IV abx with levofloxacin   Follow blood and sputum cx           Microcytic hypochromic anemia  Lower than baseline hemoglobin    Stool for occult bleeding pending   Obtain iron panel   Monitor with daily CBC           Tobacco dependence  Patient was counseled on smoking cessation and is currently ready to stop smoking  Ordered nicotine transdermal patch      VTE Risk Mitigation (From admission, onward)         Ordered     Place IRMA hose  Until discontinued      02/09/20 1636     Place sequential compression device  Until discontinued      02/09/20 1636     IP VTE LOW RISK PATIENT  Once      02/09/20 1636                       Lazaro Ferreira MD  Department of Hospital Medicine   [facility]

## 2020-02-12 LAB
ALBUMIN SERPL BCP-MCNC: 2.4 G/DL (ref 3.5–5.2)
ALP SERPL-CCNC: 50 U/L (ref 55–135)
ALT SERPL W/O P-5'-P-CCNC: 37 U/L (ref 10–44)
ANION GAP SERPL CALC-SCNC: 9 MMOL/L (ref 8–16)
ANION GAP SERPL CALC-SCNC: 9 MMOL/L (ref 8–16)
AST SERPL-CCNC: 35 U/L (ref 10–40)
BASOPHILS # BLD AUTO: 0.02 K/UL (ref 0–0.2)
BASOPHILS NFR BLD: 0.2 % (ref 0–1.9)
BILIRUB SERPL-MCNC: 0.6 MG/DL (ref 0.1–1)
BUN SERPL-MCNC: 16 MG/DL (ref 6–20)
BUN SERPL-MCNC: 16 MG/DL (ref 6–20)
CALCIUM SERPL-MCNC: 8.3 MG/DL (ref 8.7–10.5)
CALCIUM SERPL-MCNC: 8.3 MG/DL (ref 8.7–10.5)
CHLORIDE SERPL-SCNC: 105 MMOL/L (ref 95–110)
CHLORIDE SERPL-SCNC: 105 MMOL/L (ref 95–110)
CO2 SERPL-SCNC: 25 MMOL/L (ref 23–29)
CO2 SERPL-SCNC: 25 MMOL/L (ref 23–29)
CREAT SERPL-MCNC: 0.7 MG/DL (ref 0.5–1.4)
CREAT SERPL-MCNC: 0.7 MG/DL (ref 0.5–1.4)
DIFFERENTIAL METHOD: ABNORMAL
EOSINOPHIL # BLD AUTO: 0.1 K/UL (ref 0–0.5)
EOSINOPHIL NFR BLD: 0.4 % (ref 0–8)
ERYTHROCYTE [DISTWIDTH] IN BLOOD BY AUTOMATED COUNT: 21.7 % (ref 11.5–14.5)
EST. GFR  (AFRICAN AMERICAN): >60 ML/MIN/1.73 M^2
EST. GFR  (AFRICAN AMERICAN): >60 ML/MIN/1.73 M^2
EST. GFR  (NON AFRICAN AMERICAN): >60 ML/MIN/1.73 M^2
EST. GFR  (NON AFRICAN AMERICAN): >60 ML/MIN/1.73 M^2
GLUCOSE SERPL-MCNC: 93 MG/DL (ref 70–110)
GLUCOSE SERPL-MCNC: 93 MG/DL (ref 70–110)
HCT VFR BLD AUTO: 27 % (ref 40–54)
HGB BLD-MCNC: 8.2 G/DL (ref 14–18)
IMM GRANULOCYTES # BLD AUTO: 0.1 K/UL (ref 0–0.04)
IMM GRANULOCYTES NFR BLD AUTO: 0.9 % (ref 0–0.5)
LYMPHOCYTES # BLD AUTO: 1.9 K/UL (ref 1–4.8)
LYMPHOCYTES NFR BLD: 16 % (ref 18–48)
MAGNESIUM SERPL-MCNC: 1.6 MG/DL (ref 1.6–2.6)
MCH RBC QN AUTO: 20.8 PG (ref 27–31)
MCHC RBC AUTO-ENTMCNC: 30.4 G/DL (ref 32–36)
MCV RBC AUTO: 69 FL (ref 82–98)
MONOCYTES # BLD AUTO: 0.9 K/UL (ref 0.3–1)
MONOCYTES NFR BLD: 7.3 % (ref 4–15)
NEUTROPHILS # BLD AUTO: 8.8 K/UL (ref 1.8–7.7)
NEUTROPHILS NFR BLD: 75.2 % (ref 38–73)
NRBC BLD-RTO: 0 /100 WBC
PHOSPHATE SERPL-MCNC: 3.4 MG/DL (ref 2.7–4.5)
PLATELET # BLD AUTO: 511 K/UL (ref 150–350)
PMV BLD AUTO: 8.6 FL (ref 9.2–12.9)
POTASSIUM SERPL-SCNC: 3.4 MMOL/L (ref 3.5–5.1)
POTASSIUM SERPL-SCNC: 3.4 MMOL/L (ref 3.5–5.1)
PROT SERPL-MCNC: 5.9 G/DL (ref 6–8.4)
RBC # BLD AUTO: 3.94 M/UL (ref 4.6–6.2)
SODIUM SERPL-SCNC: 139 MMOL/L (ref 136–145)
SODIUM SERPL-SCNC: 139 MMOL/L (ref 136–145)
WBC # BLD AUTO: 11.72 K/UL (ref 3.9–12.7)

## 2020-02-12 PROCEDURE — 85025 COMPLETE CBC W/AUTO DIFF WBC: CPT

## 2020-02-12 PROCEDURE — 12000002 HC ACUTE/MED SURGE SEMI-PRIVATE ROOM

## 2020-02-12 PROCEDURE — 99900035 HC TECH TIME PER 15 MIN (STAT)

## 2020-02-12 PROCEDURE — 94761 N-INVAS EAR/PLS OXIMETRY MLT: CPT

## 2020-02-12 PROCEDURE — 83735 ASSAY OF MAGNESIUM: CPT

## 2020-02-12 PROCEDURE — 25000003 PHARM REV CODE 250: Performed by: INTERNAL MEDICINE

## 2020-02-12 PROCEDURE — 84100 ASSAY OF PHOSPHORUS: CPT

## 2020-02-12 PROCEDURE — S4991 NICOTINE PATCH NONLEGEND: HCPCS | Performed by: NURSE PRACTITIONER

## 2020-02-12 PROCEDURE — 94640 AIRWAY INHALATION TREATMENT: CPT

## 2020-02-12 PROCEDURE — 80053 COMPREHEN METABOLIC PANEL: CPT

## 2020-02-12 PROCEDURE — 25000242 PHARM REV CODE 250 ALT 637 W/ HCPCS: Performed by: INTERNAL MEDICINE

## 2020-02-12 PROCEDURE — 63600175 PHARM REV CODE 636 W HCPCS: Performed by: NURSE PRACTITIONER

## 2020-02-12 PROCEDURE — 25000003 PHARM REV CODE 250: Performed by: NURSE PRACTITIONER

## 2020-02-12 PROCEDURE — 36415 COLL VENOUS BLD VENIPUNCTURE: CPT

## 2020-02-12 RX ORDER — LEVALBUTEROL INHALATION SOLUTION 0.63 MG/3ML
0.63 SOLUTION RESPIRATORY (INHALATION) 2 TIMES DAILY
Status: DISCONTINUED | OUTPATIENT
Start: 2020-02-12 | End: 2020-02-13 | Stop reason: HOSPADM

## 2020-02-12 RX ADMIN — POTASSIUM CHLORIDE 20 MEQ: 20 TABLET, EXTENDED RELEASE ORAL at 10:02

## 2020-02-12 RX ADMIN — NICOTINE 1 PATCH: 21 PATCH, EXTENDED RELEASE TRANSDERMAL at 10:02

## 2020-02-12 RX ADMIN — FLUTICASONE PROPIONATE 100 MCG: 50 SPRAY, METERED NASAL at 10:02

## 2020-02-12 RX ADMIN — LEVOFLOXACIN 750 MG: 750 INJECTION, SOLUTION INTRAVENOUS at 08:02

## 2020-02-12 RX ADMIN — LEVALBUTEROL HYDROCHLORIDE 0.63 MG: 0.63 SOLUTION RESPIRATORY (INHALATION) at 08:02

## 2020-02-12 RX ADMIN — MONTELUKAST 10 MG: 10 TABLET, FILM COATED ORAL at 10:02

## 2020-02-12 RX ADMIN — PANTOPRAZOLE SODIUM 40 MG: 40 TABLET, DELAYED RELEASE ORAL at 05:02

## 2020-02-12 RX ADMIN — TRAZODONE HYDROCHLORIDE 25 MG: 50 TABLET ORAL at 10:02

## 2020-02-12 RX ADMIN — LEVALBUTEROL HYDROCHLORIDE 0.63 MG: 0.63 SOLUTION RESPIRATORY (INHALATION) at 07:02

## 2020-02-12 NOTE — SUBJECTIVE & OBJECTIVE
Interval History: persisting pneumonia    Review of Systems   Constitutional: Negative.    HENT: Negative.    Eyes: Negative.    Respiratory: Positive for cough and shortness of breath.    Cardiovascular: Negative.    Gastrointestinal: Negative.    Endocrine: Negative.    Genitourinary: Negative.    Musculoskeletal: Negative.    Skin: Negative.    Allergic/Immunologic: Negative.    Neurological: Negative.    Hematological: Negative.    All other systems reviewed and are negative.    Objective:     Vital Signs (Most Recent):  Temp: 98.6 °F (37 °C) (02/12/20 0300)  Pulse: 102 (02/12/20 0721)  Resp: 16 (02/12/20 0721)  BP: 131/79 (02/12/20 0300)  SpO2: 98 % (02/12/20 0721) Vital Signs (24h Range):  Temp:  [97.8 °F (36.6 °C)-98.7 °F (37.1 °C)] 98.6 °F (37 °C)  Pulse:  [] 102  Resp:  [16-18] 16  SpO2:  [94 %-100 %] 98 %  BP: (127-150)/(79-89) 131/79     Weight: 72.8 kg (160 lb 7.9 oz)  Body mass index is 25.9 kg/m².  No intake or output data in the 24 hours ending 02/12/20 0917   Physical Exam   Constitutional: He is oriented to person, place, and time. He appears well-developed and well-nourished.   HENT:   Head: Normocephalic and atraumatic.   Eyes: Pupils are equal, round, and reactive to light. Conjunctivae and EOM are normal.   Neck: Normal range of motion. Neck supple.   Cardiovascular: Normal rate, regular rhythm, normal heart sounds and intact distal pulses.   Pulmonary/Chest: Effort normal. He has wheezes.   Decreased entry bases with expiratory wheezes all zones, has large airway sounds right base; no crepitations   Abdominal: Soft. Bowel sounds are normal.   Musculoskeletal: Normal range of motion.   Neurological: He is alert and oriented to person, place, and time.   Skin: Skin is warm and dry. Capillary refill takes less than 2 seconds.   Psychiatric: He has a normal mood and affect. His behavior is normal. Judgment and thought content normal.   Nursing note and vitals reviewed.      Significant Labs:    BMP:   Recent Labs   Lab 02/12/20  0559   GLU 93  93     139   K 3.4*  3.4*     105   CO2 25  25   BUN 16  16   CREATININE 0.7  0.7   CALCIUM 8.3*  8.3*   MG 1.6     CBC:   Recent Labs   Lab 02/11/20  0456 02/12/20  0559   WBC 22.62* 11.72   HGB 7.4* 8.2*   HCT 24.4* 27.0*   * 511*       Significant Imaging: I have reviewed and interpreted all pertinent imaging results/findings within the past 24 hours.

## 2020-02-12 NOTE — PLAN OF CARE
02/12/20 0721   Patient Assessment/Suction   Level of Consciousness (AVPU) alert   Respiratory Effort Normal;Unlabored   All Lung Fields Breath Sounds clear;diminished   PRE-TX-O2   O2 Device (Oxygen Therapy) room air   SpO2 98 %   Pulse Oximetry Type Intermittent   $ Pulse Oximetry - Multiple Charge Pulse Oximetry - Multiple   Pulse 102   Resp 16   Aerosol Therapy   $ Aerosol Therapy Charges Aerosol Treatment   Daily Review of Necessity (SVN) completed   Respiratory Treatment Status (SVN) given   Treatment Route (SVN) mask   Patient Position (SVN) HOB elevated   Post Treatment Assessment (SVN) breath sounds improved   Signs of Intolerance (SVN) none   Breath Sounds Post-Respiratory Treatment   Throughout All Fields Post-Treatment All Fields   Throughout All Fields Post-Treatment aeration increased   Post-treatment Heart Rate (beats/min) 100   Post-treatment Resp Rate (breaths/min) 16

## 2020-02-12 NOTE — ASSESSMENT & PLAN NOTE
CXR concerning for right lower lobe pneumonia  Supplemental oxygen, wean as tolerated  Scheduled breathing treatments  D/C IV fluids  Continue IV abx with levofloxacin   Follow blood and sputum cx   Anticipate discharge home tomorrow

## 2020-02-12 NOTE — PROGRESS NOTES
"[facility] Medicine  Progress Note    Patient Name: Marcos Lynch  MRN: [id number]  Patient Class: IP- Inpatient   Admission Date: 2/9/2020  Length of Stay: 3 days  Attending Physician: Brent Gasca MD  Primary Care Provider: Primary Doctor No        Subjective:     Principal Problem:Community acquired pneumonia of right lung        HPI:  Marcos Lynch is a 60 y.o. male with a history as  has a past medical history of Hard of hearing. who presented to the ED with a Flank Pain (RT , ONSET THIS AM, PAIN WORSE WITH BREATHING) and PAIN WITH RESPIRATIONS.  Patient report productive cough, subjective fever, chills and pleuritic chest pain (described as sharp and 9/10 PRS) with inspiration and SOB. Denies dizziness, HA, chest pain, palpitations, NVD, recent trauma or any other associated symptoms.  [facility] discharged on medications (regimen completed, unsure what medication her was discharged on).  Further reports symptoms improved, but then worsened.  Lab and imaging obtained and reviewed. CBC significant for elevated WBC and decreased H/H.  CXR showed lateral right lung base airspace disease concerning for pneumonia. Admitted to med-surg for failed outpatient therapy.                    Overview/Hospital Course:  Patient gets most of his care from the; was recently treated for pneumonia and what appears to be COPD exacerbation presented here with shortness of breath, subjective fevers/chills and pleuritic chest pain. Chest x-ray with right lower lobe pneumonia.  Symptomatic improvement noted with antibiotics and breathing treatments.  Anticipate discharge home tomorrow.    02/12 Patient continues to wheeze. States he gets "short of breath" walking to bathroom and back. Has pleuritic type CP (worse with breathing and coughing). No angina. No orthopnea/PND    Interval History: persisting pneumonia    Review of Systems "   Constitutional: Negative.    HENT: Negative.    Eyes: Negative.    Respiratory: Positive for cough and shortness of breath.    Cardiovascular: Negative.    Gastrointestinal: Negative.    Endocrine: Negative.    Genitourinary: Negative.    Musculoskeletal: Negative.    Skin: Negative.    Allergic/Immunologic: Negative.    Neurological: Negative.    Hematological: Negative.    All other systems reviewed and are negative.    Objective:     Vital Signs (Most Recent):  Temp: 98.6 °F (37 °C) (02/12/20 0300)  Pulse: 102 (02/12/20 0721)  Resp: 16 (02/12/20 0721)  BP: 131/79 (02/12/20 0300)  SpO2: 98 % (02/12/20 0721) Vital Signs (24h Range):  Temp:  [97.8 °F (36.6 °C)-98.7 °F (37.1 °C)] 98.6 °F (37 °C)  Pulse:  [] 102  Resp:  [16-18] 16  SpO2:  [94 %-100 %] 98 %  BP: (127-150)/(79-89) 131/79     Weight: 72.8 kg (160 lb 7.9 oz)  Body mass index is 25.9 kg/m².  No intake or output data in the 24 hours ending 02/12/20 0917   Physical Exam   Constitutional: He is oriented to person, place, and time. He appears well-developed and well-nourished.   HENT:   Head: Normocephalic and atraumatic.   Eyes: Pupils are equal, round, and reactive to light. Conjunctivae and EOM are normal.   Neck: Normal range of motion. Neck supple.   Cardiovascular: Normal rate, regular rhythm, normal heart sounds and intact distal pulses.   Pulmonary/Chest: Effort normal. He has wheezes.   Decreased entry bases with expiratory wheezes all zones, has large airway sounds right base; no crepitations   Abdominal: Soft. Bowel sounds are normal.   Musculoskeletal: Normal range of motion.   Neurological: He is alert and oriented to person, place, and time.   Skin: Skin is warm and dry. Capillary refill takes less than 2 seconds.   Psychiatric: He has a normal mood and affect. His behavior is normal. Judgment and thought content normal.   Nursing note and vitals reviewed.      Significant Labs:   BMP:   Recent Labs   Lab 02/12/20  0559   GLU 93  93   NA  "139  139   K 3.4*  3.4*     105   CO2 25  25   BUN 16  16   CREATININE 0.7  0.7   CALCIUM 8.3*  8.3*   MG 1.6     CBC:   Recent Labs   Lab 02/11/20  0456 02/12/20  0559   WBC 22.62* 11.72   HGB 7.4* 8.2*   HCT 24.4* 27.0*   * 511*       Significant Imaging: I have reviewed and interpreted all pertinent imaging results/findings within the past 24 hours.      Assessment/Plan:      * Community acquired pneumonia of right lung  CXR concerning for right lower lobe pneumonia  Supplemental oxygen, wean as tolerated  Scheduled breathing treatments  D/C IV fluids  Continue IV abx with levofloxacin   Follow blood and sputum cx   Anticipate discharge home tomorrow        Microcytic hypochromic anemia  Lower than baseline hemoglobin    Stool for occult bleeding pending   Iron profile with significant iron deficiency  Ordered 1 dose of IV iron   Will need oral FeSO4 at the time of discharge   Monitor with daily CBC           Tobacco dependence  Patient was counseled on smoking cessation and is currently ready to stop smoking  Ordered nicotine transdermal patch  " 6 minute walk"      VTE Risk Mitigation (From admission, onward)         Ordered     Place IRMA hose  Until discontinued      02/09/20 1636     Place sequential compression device  Until discontinued      02/09/20 1636     IP VTE LOW RISK PATIENT  Once      02/09/20 1636                      Brent Gasca MD  Department of Hospital Medicine   [facility]  "

## 2020-02-12 NOTE — PROGRESS NOTES
[facility] Medicine  Progress Note    Patient Name: Marcos Lynch  MRN: [id number]  Patient Class: IP- Inpatient   Admission Date: 2/9/2020  Length of Stay: 2 days  Attending Physician: Lazaro Ferreira MD  Primary Care Provider: Primary Doctor No        Subjective:     Principal Problem:Community acquired pneumonia of right lung        HPI:  Marcos Lynch is a 60 y.o. male with a history as  has a past medical history of Hard of hearing. who presented to the ED with a Flank Pain (RT , ONSET THIS AM, PAIN WORSE WITH BREATHING) and PAIN WITH RESPIRATIONS.  Patient report productive cough, subjective fever, chills and pleuritic chest pain (described as sharp and 9/10 PRS) with inspiration and SOB. Denies dizziness, HA, chest pain, palpitations, NVD, recent trauma or any other associated symptoms.  [facility] on [facility] (regimen completed, unsure what medication her was discharged on).  Further reports symptoms improved, but then worsened.  Lab and imaging obtained and reviewed. CBC significant for elevated WBC and decreased H/H.  CXR showed lateral right lung base airspace disease concerning for pneumonia. Admitted to med-surg for failed outpatient therapy.                    Overview/Hospital Course:  Patient gets most of his care from the; was recently treated for pneumonia and what appears to be COPD exacerbation presented here with shortness of breath, subjective fevers/chills and pleuritic chest pain. Chest x-ray with right lower lobe pneumonia.  Symptomatic improvement noted with antibiotics and breathing treatments.  Anticipate discharge home tomorrow.    Interval History:  No acute overnight events reported.  Still has cough with productive sputum.  CBC with worsening leukocytosis.  Still has pleuritic right lower quadrant pain which is worse with deep breathing and coughing.  Feels weak.      Objective:      Vital Signs (Most Recent):  Temp: 98.7 °F (37.1 °C) (02/11/20 1636)  Pulse: 97 (02/11/20 1928)  Resp: 18 (02/11/20 1928)  BP: (!) 150/87 (02/11/20 1636)  SpO2: 98 % (02/11/20 1928) Vital Signs (24h Range):  Temp:  [97.9 °F (36.6 °C)-98.7 °F (37.1 °C)] 98.7 °F (37.1 °C)  Pulse:  [] 97  Resp:  [16-18] 18  SpO2:  [98 %-100 %] 98 %  BP: (122-150)/(76-89) 150/87     Weight: 72.8 kg (160 lb 7.9 oz)  Body mass index is 25.9 kg/m².  No intake or output data in the 24 hours ending 02/11/20 2025   Physical Exam   Constitutional: He is oriented to person, place, and time. He appears well-developed and well-nourished. No distress.   HENT:   Head: Normocephalic and atraumatic.   Mouth/Throat: No oropharyngeal exudate.   Eyes: Conjunctivae are normal. No scleral icterus.   Neck: Neck supple. No thyromegaly present.   Cardiovascular: Normal rate, regular rhythm and normal heart sounds.   No murmur heard.  Pulmonary/Chest: Effort normal. No tachypnea. No respiratory distress. He has wheezes. He has no rales.   Abdominal: Soft. Bowel sounds are normal. He exhibits no distension. There is no tenderness.   Musculoskeletal: He exhibits no edema or deformity.   Neurological: He is alert and oriented to person, place, and time. He has normal strength. No sensory deficit.   Skin: Skin is warm. Capillary refill takes less than 2 seconds. No rash noted.   Psychiatric: He has a normal mood and affect. His behavior is normal.   Nursing note and vitals reviewed.      Significant Labs:   CBC:   Recent Labs   Lab 02/10/20  0454 02/11/20 0456   WBC 15.89*  15.89* 22.62*   HGB 7.9*  7.9* 7.4*   HCT 26.8*  26.8* 24.4*   *  464* 464*     CMP:   Recent Labs   Lab 02/10/20  0454 02/11/20  0456     138 138  138   K 4.1  4.1 3.8  3.8     107 106  106   CO2 24  24 24  24   *  163* 138*  138*   BUN 19  19 19  19   CREATININE 0.9  0.9 0.8  0.8   CALCIUM 8.4*  8.4* 8.3*  8.3*   PROT 6.6 6.0   ALBUMIN  2.6* 2.4*   BILITOT 0.6 0.3   ALKPHOS 49* 44*   AST 28 20   ALT 30 24   ANIONGAP 7*  7* 8  8   EGFRNONAA >60.0  >60.0 >60.0  >60.0       Significant Imaging: I have reviewed all pertinent imaging results/findings within the past 24 hours.     US Abdomen Limited [[id number]] Resulted: 02/11/20 0703   Order Status: Completed Updated: 02/11/20 0706   Narrative:     EXAMINATION:  US ABDOMEN LIMITED    CLINICAL HISTORY:  Right upper quadrant pain - worse with breathing;    TECHNIQUE:  Grayscale and color Doppler evaluation of the abdomen was performed.    COMPARISON:  10/16/2019 CT abdomen and pelvis    FINDINGS:  Visualized aorta and IVC are unremarkable.  Limited sonographic assessment of the pancreatic body is unremarkable, with portions of the pancreas not visible due to bowel gas.    Liver measures 16.2 cm in length.  Liver contour is smooth.  Echogenicity is normal.  No focal hepatic lesion was identified.  Main portal vein is patent with hepatopetal flow.  No gallbladder wall thickening, gallstones, or pericholecystic fluid.  Negative sonographic Chapa sign.  Common bile duct is normal in caliber measuring 5 mm.    Right kidney measures 12.5 cm in length and has a normal sonographic appearance.   Impression:       No sonographic evidence of acute abdominal pathology.      Electronically signed by: Jose Guadalupe Lu MD  Date: 02/11/2020  Time: 07:03           Assessment/Plan:      * Community acquired pneumonia of right lung  CXR concerning for right lower lobe pneumonia  Supplemental oxygen, wean as tolerated  Scheduled breathing treatments  D/C IV fluids  Continue IV abx with levofloxacin   Follow blood and sputum cx   Anticipate discharge home tomorrow        Microcytic hypochromic anemia  Lower than baseline hemoglobin    Stool for occult bleeding pending   Iron profile with significant iron deficiency  Ordered 1 dose of IV iron   Will need oral FeSO4 at the time of discharge   Monitor with daily CBC            Tobacco dependence  Patient was counseled on smoking cessation and is currently ready to stop smoking  Ordered nicotine transdermal patch      VTE Risk Mitigation (From admission, onward)         Ordered     Place IRMA hose  Until discontinued      02/09/20 1636     Place sequential compression device  Until discontinued      02/09/20 1636     IP VTE LOW RISK PATIENT  Once      02/09/20 1636                      Lazaro Ferreira MD  Department of Hospital Medicine   [facility]

## 2020-02-12 NOTE — ASSESSMENT & PLAN NOTE
"Patient was counseled on smoking cessation and is currently ready to stop smoking  Ordered nicotine transdermal patch  " 6 minute walk"  "

## 2020-02-12 NOTE — HOSPITAL COURSE
"Patient gets most of his care from the; was recently treated for pneumonia and what appears to be COPD exacerbation presented here with shortness of breath, subjective fevers/chills and pleuritic chest pain. Chest x-ray with right lower lobe pneumonia.  Symptomatic improvement noted with antibiotics and breathing treatments.  Anticipate discharge home tomorrow.    02/12 Patient continues to wheeze. States he gets "short of breath" walking to bathroom and back. Has pleuritic type CP (worse with breathing and coughing). No angina. No orthopnea/PND    02/13 Feels "Much better", and would like to be discharged home with outpatient follow-up.  VSS  Lungs: decreased entry without adventitious sounds  Heart: S1S2 reg  Abdo: soft  "

## 2020-02-12 NOTE — PLAN OF CARE
02/11/20 1928   Patient Assessment/Suction   Level of Consciousness (AVPU) alert   Respiratory Effort Unlabored   Expansion/Accessory Muscles/Retractions no use of accessory muscles   All Lung Fields Breath Sounds Anterior:;diminished   Rhythm/Pattern, Respiratory no shortness of breath reported   Cough Frequency no cough   PRE-TX-O2   O2 Device (Oxygen Therapy) room air   Oxygen Concentration (%) 21   SpO2 98 %   Pulse Oximetry Type Intermittent   $ Pulse Oximetry - Multiple Charge Pulse Oximetry - Multiple   Pulse 97   Resp 18   Aerosol Therapy   $ Aerosol Therapy Charges Aerosol Treatment   Daily Review of Necessity (SVN) completed   Respiratory Treatment Status (SVN) given   Treatment Route (SVN) mask;oxygen   Patient Position (SVN) HOB elevated;semi-Peterson's   Post Treatment Assessment (SVN) breath sounds improved   Signs of Intolerance (SVN) none   Breath Sounds Post-Respiratory Treatment   Throughout All Fields Post-Treatment All Fields   Throughout All Fields Post-Treatment no change   Post-treatment Heart Rate (beats/min) 98   Post-treatment Resp Rate (breaths/min) 18   Ready to Wean/Extubation Screen   FIO2<=50 (chart decimal) 0.21

## 2020-02-12 NOTE — ASSESSMENT & PLAN NOTE
Lower than baseline hemoglobin    Stool for occult bleeding pending   Iron profile with significant iron deficiency  Ordered 1 dose of IV iron   Will need oral FeSO4 at the time of discharge   Monitor with daily CBC

## 2020-02-12 NOTE — SUBJECTIVE & OBJECTIVE
Interval History:  No acute overnight events reported.  Still has cough with productive sputum.  CBC with worsening leukocytosis.  Still has pleuritic right lower quadrant pain which is worse with deep breathing and coughing.  Feels weak.      Objective:     Vital Signs (Most Recent):  Temp: 98.7 °F (37.1 °C) (02/11/20 1636)  Pulse: 97 (02/11/20 1928)  Resp: 18 (02/11/20 1928)  BP: (!) 150/87 (02/11/20 1636)  SpO2: 98 % (02/11/20 1928) Vital Signs (24h Range):  Temp:  [97.9 °F (36.6 °C)-98.7 °F (37.1 °C)] 98.7 °F (37.1 °C)  Pulse:  [] 97  Resp:  [16-18] 18  SpO2:  [98 %-100 %] 98 %  BP: (122-150)/(76-89) 150/87     Weight: 72.8 kg (160 lb 7.9 oz)  Body mass index is 25.9 kg/m².  No intake or output data in the 24 hours ending 02/11/20 2025   Physical Exam   Constitutional: He is oriented to person, place, and time. He appears well-developed and well-nourished. No distress.   HENT:   Head: Normocephalic and atraumatic.   Mouth/Throat: No oropharyngeal exudate.   Eyes: Conjunctivae are normal. No scleral icterus.   Neck: Neck supple. No thyromegaly present.   Cardiovascular: Normal rate, regular rhythm and normal heart sounds.   No murmur heard.  Pulmonary/Chest: Effort normal. No tachypnea. No respiratory distress. He has wheezes. He has no rales.   Abdominal: Soft. Bowel sounds are normal. He exhibits no distension. There is no tenderness.   Musculoskeletal: He exhibits no edema or deformity.   Neurological: He is alert and oriented to person, place, and time. He has normal strength. No sensory deficit.   Skin: Skin is warm. Capillary refill takes less than 2 seconds. No rash noted.   Psychiatric: He has a normal mood and affect. His behavior is normal.   Nursing note and vitals reviewed.      Significant Labs:   CBC:   Recent Labs   Lab 02/10/20  0454 02/11/20  0456   WBC 15.89*  15.89* 22.62*   HGB 7.9*  7.9* 7.4*   HCT 26.8*  26.8* 24.4*   *  464* 464*     CMP:   Recent Labs   Lab 02/10/20  0454  02/11/20  0456     138 138  138   K 4.1  4.1 3.8  3.8     107 106  106   CO2 24  24 24  24   *  163* 138*  138*   BUN 19  19 19  19   CREATININE 0.9  0.9 0.8  0.8   CALCIUM 8.4*  8.4* 8.3*  8.3*   PROT 6.6 6.0   ALBUMIN 2.6* 2.4*   BILITOT 0.6 0.3   ALKPHOS 49* 44*   AST 28 20   ALT 30 24   ANIONGAP 7*  7* 8  8   EGFRNONAA >60.0  >60.0 >60.0  >60.0       Significant Imaging: I have reviewed all pertinent imaging results/findings within the past 24 hours.     US Abdomen Limited [[id number]] Resulted: 02/11/20 0703   Order Status: Completed Updated: 02/11/20 0706   Narrative:     EXAMINATION:  US ABDOMEN LIMITED    CLINICAL HISTORY:  Right upper quadrant pain - worse with breathing;    TECHNIQUE:  Grayscale and color Doppler evaluation of the abdomen was performed.    COMPARISON:  10/16/2019 CT abdomen and pelvis    FINDINGS:  Visualized aorta and IVC are unremarkable.  Limited sonographic assessment of the pancreatic body is unremarkable, with portions of the pancreas not visible due to bowel gas.    Liver measures 16.2 cm in length.  Liver contour is smooth.  Echogenicity is normal.  No focal hepatic lesion was identified.  Main portal vein is patent with hepatopetal flow.  No gallbladder wall thickening, gallstones, or pericholecystic fluid.  Negative sonographic Chapa sign.  Common bile duct is normal in caliber measuring 5 mm.    Right kidney measures 12.5 cm in length and has a normal sonographic appearance.   Impression:       No sonographic evidence of acute abdominal pathology.      Electronically signed by: Jose Guadalupe Lu MD  Date: 02/11/2020  Time: 07:03

## 2020-02-13 VITALS
BODY MASS INDEX: 25.79 KG/M2 | WEIGHT: 160.5 LBS | TEMPERATURE: 98 F | OXYGEN SATURATION: 97 % | DIASTOLIC BLOOD PRESSURE: 86 MMHG | SYSTOLIC BLOOD PRESSURE: 123 MMHG | HEIGHT: 66 IN | RESPIRATION RATE: 17 BRPM | HEART RATE: 109 BPM

## 2020-02-13 PROBLEM — J18.9 COMMUNITY ACQUIRED PNEUMONIA OF RIGHT LUNG: Status: RESOLVED | Noted: 2020-02-09 | Resolved: 2020-02-13

## 2020-02-13 LAB
ANION GAP SERPL CALC-SCNC: 10 MMOL/L (ref 8–16)
ANION GAP SERPL CALC-SCNC: 10 MMOL/L (ref 8–16)
BASOPHILS # BLD AUTO: 0.04 K/UL (ref 0–0.2)
BASOPHILS # BLD AUTO: 0.04 K/UL (ref 0–0.2)
BASOPHILS NFR BLD: 0.5 % (ref 0–1.9)
BASOPHILS NFR BLD: 0.5 % (ref 0–1.9)
BUN SERPL-MCNC: 13 MG/DL (ref 6–20)
BUN SERPL-MCNC: 13 MG/DL (ref 6–20)
CALCIUM SERPL-MCNC: 8.3 MG/DL (ref 8.7–10.5)
CALCIUM SERPL-MCNC: 8.3 MG/DL (ref 8.7–10.5)
CHLORIDE SERPL-SCNC: 100 MMOL/L (ref 95–110)
CHLORIDE SERPL-SCNC: 100 MMOL/L (ref 95–110)
CO2 SERPL-SCNC: 27 MMOL/L (ref 23–29)
CO2 SERPL-SCNC: 27 MMOL/L (ref 23–29)
CREAT SERPL-MCNC: 0.8 MG/DL (ref 0.5–1.4)
CREAT SERPL-MCNC: 0.8 MG/DL (ref 0.5–1.4)
DIFFERENTIAL METHOD: ABNORMAL
DIFFERENTIAL METHOD: ABNORMAL
EOSINOPHIL # BLD AUTO: 0.3 K/UL (ref 0–0.5)
EOSINOPHIL # BLD AUTO: 0.3 K/UL (ref 0–0.5)
EOSINOPHIL NFR BLD: 3 % (ref 0–8)
EOSINOPHIL NFR BLD: 3 % (ref 0–8)
ERYTHROCYTE [DISTWIDTH] IN BLOOD BY AUTOMATED COUNT: 22 % (ref 11.5–14.5)
ERYTHROCYTE [DISTWIDTH] IN BLOOD BY AUTOMATED COUNT: 22 % (ref 11.5–14.5)
EST. GFR  (AFRICAN AMERICAN): >60 ML/MIN/1.73 M^2
EST. GFR  (AFRICAN AMERICAN): >60 ML/MIN/1.73 M^2
EST. GFR  (NON AFRICAN AMERICAN): >60 ML/MIN/1.73 M^2
EST. GFR  (NON AFRICAN AMERICAN): >60 ML/MIN/1.73 M^2
GLUCOSE SERPL-MCNC: 101 MG/DL (ref 70–110)
GLUCOSE SERPL-MCNC: 101 MG/DL (ref 70–110)
HCT VFR BLD AUTO: 28.5 % (ref 40–54)
HCT VFR BLD AUTO: 28.5 % (ref 40–54)
HGB BLD-MCNC: 8.5 G/DL (ref 14–18)
HGB BLD-MCNC: 8.5 G/DL (ref 14–18)
IMM GRANULOCYTES # BLD AUTO: 0.3 K/UL (ref 0–0.04)
IMM GRANULOCYTES # BLD AUTO: 0.3 K/UL (ref 0–0.04)
IMM GRANULOCYTES NFR BLD AUTO: 3.6 % (ref 0–0.5)
IMM GRANULOCYTES NFR BLD AUTO: 3.6 % (ref 0–0.5)
LYMPHOCYTES # BLD AUTO: 1.7 K/UL (ref 1–4.8)
LYMPHOCYTES # BLD AUTO: 1.7 K/UL (ref 1–4.8)
LYMPHOCYTES NFR BLD: 20.7 % (ref 18–48)
LYMPHOCYTES NFR BLD: 20.7 % (ref 18–48)
MAGNESIUM SERPL-MCNC: 1.7 MG/DL (ref 1.6–2.6)
MCH RBC QN AUTO: 20.6 PG (ref 27–31)
MCH RBC QN AUTO: 20.6 PG (ref 27–31)
MCHC RBC AUTO-ENTMCNC: 29.8 G/DL (ref 32–36)
MCHC RBC AUTO-ENTMCNC: 29.8 G/DL (ref 32–36)
MCV RBC AUTO: 69 FL (ref 82–98)
MCV RBC AUTO: 69 FL (ref 82–98)
MONOCYTES # BLD AUTO: 1 K/UL (ref 0.3–1)
MONOCYTES # BLD AUTO: 1 K/UL (ref 0.3–1)
MONOCYTES NFR BLD: 11.4 % (ref 4–15)
MONOCYTES NFR BLD: 11.4 % (ref 4–15)
NEUTROPHILS # BLD AUTO: 5 K/UL (ref 1.8–7.7)
NEUTROPHILS # BLD AUTO: 5 K/UL (ref 1.8–7.7)
NEUTROPHILS NFR BLD: 60.8 % (ref 38–73)
NEUTROPHILS NFR BLD: 60.8 % (ref 38–73)
NRBC BLD-RTO: 0 /100 WBC
NRBC BLD-RTO: 0 /100 WBC
PHOSPHATE SERPL-MCNC: 3.2 MG/DL (ref 2.7–4.5)
PLATELET # BLD AUTO: 517 K/UL (ref 150–350)
PLATELET # BLD AUTO: 517 K/UL (ref 150–350)
PMV BLD AUTO: 8.7 FL (ref 9.2–12.9)
PMV BLD AUTO: 8.7 FL (ref 9.2–12.9)
POTASSIUM SERPL-SCNC: 3.1 MMOL/L (ref 3.5–5.1)
POTASSIUM SERPL-SCNC: 3.1 MMOL/L (ref 3.5–5.1)
RBC # BLD AUTO: 4.13 M/UL (ref 4.6–6.2)
RBC # BLD AUTO: 4.13 M/UL (ref 4.6–6.2)
SODIUM SERPL-SCNC: 137 MMOL/L (ref 136–145)
SODIUM SERPL-SCNC: 137 MMOL/L (ref 136–145)
WBC # BLD AUTO: 8.3 K/UL (ref 3.9–12.7)
WBC # BLD AUTO: 8.3 K/UL (ref 3.9–12.7)

## 2020-02-13 PROCEDURE — 25000003 PHARM REV CODE 250: Performed by: NURSE PRACTITIONER

## 2020-02-13 PROCEDURE — 83735 ASSAY OF MAGNESIUM: CPT

## 2020-02-13 PROCEDURE — 25000242 PHARM REV CODE 250 ALT 637 W/ HCPCS: Performed by: INTERNAL MEDICINE

## 2020-02-13 PROCEDURE — 84100 ASSAY OF PHOSPHORUS: CPT

## 2020-02-13 PROCEDURE — 25000003 PHARM REV CODE 250: Performed by: INTERNAL MEDICINE

## 2020-02-13 PROCEDURE — S4991 NICOTINE PATCH NONLEGEND: HCPCS | Performed by: NURSE PRACTITIONER

## 2020-02-13 PROCEDURE — 94640 AIRWAY INHALATION TREATMENT: CPT

## 2020-02-13 PROCEDURE — 85025 COMPLETE CBC W/AUTO DIFF WBC: CPT

## 2020-02-13 PROCEDURE — 80048 BASIC METABOLIC PNL TOTAL CA: CPT

## 2020-02-13 PROCEDURE — 36415 COLL VENOUS BLD VENIPUNCTURE: CPT

## 2020-02-13 RX ORDER — IPRATROPIUM BROMIDE AND ALBUTEROL SULFATE 2.5; .5 MG/3ML; MG/3ML
3 SOLUTION RESPIRATORY (INHALATION) EVERY 6 HOURS PRN
Qty: 90 ML | Refills: 0 | Status: SHIPPED | OUTPATIENT
Start: 2020-02-13 | End: 2021-02-12

## 2020-02-13 RX ORDER — MONTELUKAST SODIUM 10 MG/1
10 TABLET ORAL NIGHTLY
Qty: 30 TABLET | Refills: 0 | Status: SHIPPED | OUTPATIENT
Start: 2020-02-13 | End: 2020-03-14

## 2020-02-13 RX ORDER — LEVOFLOXACIN 500 MG/1
500 TABLET, FILM COATED ORAL DAILY
Qty: 7 TABLET | Refills: 0 | Status: SHIPPED | OUTPATIENT
Start: 2020-02-13 | End: 2020-02-20

## 2020-02-13 RX ADMIN — FLUTICASONE PROPIONATE 100 MCG: 50 SPRAY, METERED NASAL at 09:02

## 2020-02-13 RX ADMIN — PANTOPRAZOLE SODIUM 40 MG: 40 TABLET, DELAYED RELEASE ORAL at 05:02

## 2020-02-13 RX ADMIN — LEVALBUTEROL HYDROCHLORIDE 0.63 MG: 0.63 SOLUTION RESPIRATORY (INHALATION) at 09:02

## 2020-02-13 RX ADMIN — NICOTINE 1 PATCH: 21 PATCH, EXTENDED RELEASE TRANSDERMAL at 09:02

## 2020-02-13 NOTE — PLAN OF CARE
02/13/20 1014   Final Note   Assessment Type Final Discharge Note   Anticipated Discharge Disposition Home       CM delivered nebulizer to patient 10:15.

## 2020-02-13 NOTE — PLAN OF CARE
02/12/20 2029   Patient Assessment/Suction   Level of Consciousness (AVPU) alert   Respiratory Effort Normal   Expansion/Accessory Muscles/Retractions no use of accessory muscles   JL Breath Sounds wheezes, expiratory   LLL Breath Sounds wheezes, expiratory   Rhythm/Pattern, Respiratory unlabored   Cough Frequency frequent   Cough Type nonproductive   PRE-TX-O2   O2 Device (Oxygen Therapy) room air   SpO2 97 %   Pulse Oximetry Type Intermittent   $ Pulse Oximetry - Multiple Charge Pulse Oximetry - Multiple   Pulse (!) 130   Resp 18   Aerosol Therapy   $ Aerosol Therapy Charges Aerosol Treatment   Daily Review of Necessity (SVN) completed   Respiratory Treatment Status (SVN) given   Treatment Route (SVN) mask   Patient Position (SVN) HOB elevated   Post Treatment Assessment (SVN) breath sounds unchanged   Signs of Intolerance (SVN) none   Breath Sounds Post-Respiratory Treatment   Post-treatment Heart Rate (beats/min) 128   Post-treatment Resp Rate (breaths/min) 18   Respiratory Evaluation   $ Care Plan Tech Time 15 min   Evaluation For Re-Eval 3 day   Admitting Diagnosis Pneumonia

## 2020-02-13 NOTE — DISCHARGE SUMMARY
"[facility]  Discharge Summary      Patient Name: Marcos Lynch  MRN: [id number]  Admission Date: 2/9/2020  Hospital Length of Stay: 4 days  Discharge Date and Time:  02/13/2020 9:24 AM  Attending Physician: Brent Gasca MD   Discharging Provider: Brent Gasca MD  Primary Care Provider: Primary Doctor No      HPI:   Marcos Lynch is a 60 y.o. male with a history as  has a past medical history of Hard of hearing. who presented to the ED with a Flank Pain (RT , ONSET THIS AM, PAIN WORSE WITH BREATHING) and PAIN WITH RESPIRATIONS.  Patient report productive cough, subjective fever, chills and pleuritic chest pain (described as sharp and 9/10 PRS) with inspiration and SOB. Denies dizziness, HA, chest pain, palpitations, NVD, recent trauma or any other associated symptoms.  [facility] discharged on medications (regimen completed, unsure what medication her was discharged on).  Further reports symptoms improved, but then worsened.  Lab and imaging obtained and reviewed. CBC significant for elevated WBC and decreased H/H.  CXR showed lateral right lung base airspace disease concerning for pneumonia. Admitted to med-surg for failed outpatient therapy.                    * No surgery found *      Hospital Course:   Patient gets most of his care from the; was recently treated for pneumonia and what appears to be COPD exacerbation presented here with shortness of breath, subjective fevers/chills and pleuritic chest pain. Chest x-ray with right lower lobe pneumonia.  Symptomatic improvement noted with antibiotics and breathing treatments.  Anticipate discharge home tomorrow.    02/12 Patient continues to wheeze. States he gets "short of breath" walking to bathroom and back. Has pleuritic type CP (worse with breathing and coughing). No angina. No orthopnea/PND    02/13 Feels "Much better", and would like to be discharged home " "with outpatient follow-up.  VSS  Lungs: decreased entry without adventitious sounds  Heart: S1S2 reg  Abdo: soft     Consults:   Consults (From admission, onward)        Status Ordering Provider     Inpatient consult to Hospitalist  Once     Provider:  KYLE Kong    Acknowledged LILIAN CASTANEDA          No new Assessment & Plan notes have been filed under this hospital service since the last note was generated.  Service: Hospital Medicine    Final Active Diagnoses:    Diagnosis Date Noted POA    Tobacco dependence [F17.200] 02/09/2020 Yes    Microcytic hypochromic anemia [D50.9] 02/09/2020 Yes      Problems Resolved During this Admission:    Diagnosis Date Noted Date Resolved POA    PRINCIPAL PROBLEM:  Community acquired pneumonia of right lung [J18.9] 02/09/2020 02/13/2020 Yes    Hypokalemia [E87.6] 02/09/2020 02/10/2020 Yes       Discharged Condition: good    Disposition: Home or Self Care    Follow Up:    Patient Instructions:      NEBULIZER FOR HOME USE     Order Specific Question Answer Comments   Height: 5' 6" (1.676 m)    Weight: 72.8 kg (160 lb 7.9 oz)    Does patient have medical equipment at home? none    Length of need (1-99 months): 99      Diet Cardiac     Activity as tolerated       Significant Diagnostic Studies: Labs:   BMP:   Recent Labs   Lab 02/12/20  0559 02/13/20  0432   GLU 93  93 101  101     139 137  137   K 3.4*  3.4* 3.1*  3.1*     105 100  100   CO2 25  25 27  27   BUN 16  16 13  13   CREATININE 0.7  0.7 0.8  0.8   CALCIUM 8.3*  8.3* 8.3*  8.3*   MG 1.6 1.7    and CBC   Recent Labs   Lab 02/12/20  0559 02/13/20  0432   WBC 11.72 8.30  8.30   HGB 8.2* 8.5*  8.5*   HCT 27.0* 28.5*  28.5*   * 517*  517*       Pending Diagnostic Studies:     None         Medications:  Reconciled Home Medications:      Medication List      START taking these medications    albuterol-ipratropium 2.5 mg-0.5 mg/3 mL nebulizer solution  Commonly known as:  " DUO-NEB  Take 3 mLs by nebulization every 6 (six) hours as needed for Wheezing. Rescue     levoFLOXacin 500 MG tablet  Commonly known as:  LEVAQUIN  Take 1 tablet (500 mg total) by mouth once daily. for 7 days     montelukast 10 mg tablet  Commonly known as:  SINGULAIR  Take 1 tablet (10 mg total) by mouth every evening.            Indwelling Lines/Drains at time of discharge:   Lines/Drains/Airways     None                 Time spent on the discharge of patient: 32 minutes  Patient was seen and examined on the date of discharge and determined to be suitable for discharge.         Brent Gasca MD  Department of Hospital Medicine  [facility]

## 2020-02-14 LAB
BACTERIA BLD CULT: NORMAL
BACTERIA BLD CULT: NORMAL